# Patient Record
Sex: FEMALE | Race: WHITE | HISPANIC OR LATINO | Employment: UNEMPLOYED | URBAN - METROPOLITAN AREA
[De-identification: names, ages, dates, MRNs, and addresses within clinical notes are randomized per-mention and may not be internally consistent; named-entity substitution may affect disease eponyms.]

---

## 2017-03-21 ENCOUNTER — OFFICE VISIT (OUTPATIENT)
Dept: MEDICAL GROUP | Facility: PHYSICIAN GROUP | Age: 32
End: 2017-03-21
Payer: COMMERCIAL

## 2017-03-21 VITALS
OXYGEN SATURATION: 97 % | SYSTOLIC BLOOD PRESSURE: 118 MMHG | DIASTOLIC BLOOD PRESSURE: 70 MMHG | TEMPERATURE: 97.3 F | HEIGHT: 56 IN | WEIGHT: 140 LBS | HEART RATE: 84 BPM | BODY MASS INDEX: 31.49 KG/M2

## 2017-03-21 DIAGNOSIS — Z11.3 SCREENING FOR STD (SEXUALLY TRANSMITTED DISEASE): ICD-10-CM

## 2017-03-21 DIAGNOSIS — Z86.32 HISTORY OF GESTATIONAL DIABETES: ICD-10-CM

## 2017-03-21 DIAGNOSIS — R10.13 EPIGASTRIC PAIN: ICD-10-CM

## 2017-03-21 DIAGNOSIS — N94.10 DYSPAREUNIA IN FEMALE: ICD-10-CM

## 2017-03-21 DIAGNOSIS — E66.9 OBESITY, UNSPECIFIED OBESITY SEVERITY, UNSPECIFIED OBESITY TYPE: ICD-10-CM

## 2017-03-21 PROCEDURE — 99214 OFFICE O/P EST MOD 30 MIN: CPT | Performed by: FAMILY MEDICINE

## 2017-03-21 RX ORDER — OMEPRAZOLE 20 MG/1
20 CAPSULE, DELAYED RELEASE ORAL
Qty: 30 CAP | Refills: 5 | Status: SHIPPED | OUTPATIENT
Start: 2017-03-21 | End: 2022-03-09

## 2017-03-21 ASSESSMENT — PAIN SCALES - GENERAL: PAINLEVEL: 8=MODERATE-SEVERE PAIN

## 2017-03-21 ASSESSMENT — PATIENT HEALTH QUESTIONNAIRE - PHQ9: CLINICAL INTERPRETATION OF PHQ2 SCORE: 2

## 2017-03-22 NOTE — ASSESSMENT & PLAN NOTE
She has history of gestational diabetes with her previous 2 pregnancies. Her last pregnancy was 3 years back. Since she is at risk for developing diabetes she wants to be checked for that today.

## 2017-03-22 NOTE — PROGRESS NOTES
Subjective:   Jyotsna Franks is a 31 y.o. female here today for abdominal pain.    Epigastric pain  Patient complains of epigastric pain that has been going on for 4 weeks. 4 weeks prior, she had an episode of possibly food poisoning after eating at a restaurant. That however resolved but the following day she states that she had black soft stool which lasted only for one day. Since then she reports that she has intermittent epigastric pain, burning in character, worse with eating spicy food, more meat, caffeine, alcohol. Denies any nausea, vomiting.    History of gestational diabetes  She has history of gestational diabetes with her previous 2 pregnancies. Her last pregnancy was 3 years back. Since she is at risk for developing diabetes she wants to be checked for that today.    Dyspareunia in female  Reports pain during sexual activity, specially during the penetration phase.Has tried lubricants but that gives burning sensation.Has sexual activity about 1-2 times weekly.Denies any vaginal discharge or burning with urination.No history of STDs but would like to be check for STDs.           Current medicines (including changes today)  Current Outpatient Prescriptions   Medication Sig Dispense Refill   • omeprazole (PRILOSEC) 20 MG delayed-release capsule Take 1 Cap by mouth every morning before breakfast. 30 Cap 5   • albuterol (VENTOLIN OR PROVENTIL) 108 (90 BASE) MCG/ACT Aero Soln inhalation aerosol Inhale 2 Puffs by mouth every 6 hours as needed for Shortness of Breath. 8.5 g 0   • azithromycin (ZITHROMAX) 250 MG Tab Take two tabs on day one followed by one tab on days 2-5. 6 Tab 0   • benzonatate (TESSALON) 100 MG Cap Take 1 Cap by mouth 3 times a day as needed for Cough. 60 Cap 0   • guaifenesin-codeine (ROBITUSSIN AC) Solution oral solution Take 5 mL by mouth at bedtime as needed for Cough. 120 mL 0   • oxycodone-acetaminophen (PERCOCET) 5-325 MG TABS Take 1 Tab by mouth every four hours as needed  "for Moderate Pain ((Pain Scale 4-6); If acetaminophen ineffective). 15 Tab 0   • ibuprofen (MOTRIN) 800 MG TABS Take 1 Tab by mouth every 8 hours as needed (Cramping). 30 Tab 1     No current facility-administered medications for this visit.     She  has a past medical history of Gestational diabetes.    ROS   No chest pain, no shortness of breath, no abdominal pain  No cough, URI symptoms.  No heat or cold intolerance. No constipation, diarrhea.     Objective:     Blood pressure 118/70, pulse 84, temperature 36.3 °C (97.3 °F), height 1.41 m (4' 7.5\"), weight 63.504 kg (140 lb), last menstrual period 01/06/2014, SpO2 97 %. Body mass index is 31.94 kg/(m^2).     PHYSICAL EXAM     GEN: Alert and oriented,well appearing, no acute distress  SKIN: warm, dry to touch, no rashes or lesions in visible areas  PSYCH: mood and affect normal, judgement normal  EYES: Conjunctiva clear, lids normal,pupils equal round and reactive  ENMT: Normal external nose and ears,EACs normal appearing, TM pearly gray with normal light reflex bilaterally,nasal mucosa and turbinates normal appearing without erythema or edema, lips without lesions,good dentition,oropharynx clear  Neck : Trachea midline, no masses or swelling, no thyromegaly  LYMPHATIC : No cervical or supraclavicular lymphadenopathy  RESPIRATORY : Unlabored respiratory effort, no distress noted, clear to auscultation bilaterally, no wheeze, rhonchi, crackles  CARDIOVASCULAR: RRR, S1 S2 normal, no murmurs  GI: Soft,epigastric tenderness + , No rebound or guarding, no hepatosplenomegaly, no masses, Worley's sing negative  MUSCULOSKELETAL : Normal gait and stance, no obvious abnormalities   NEURO: No overt focal neurologic deficits,sensation intact        Assessment and Plan:   The following treatment plan was discussed    1. Epigastric pain  This is likely secondary to gastritis vs PUD. Advised patient to cut down on spicy, oily food and also caffeine intake.  - H.PYLORI STOOL " ANTIGEN; Future  - CBC WITH DIFFERENTIAL; Future  - COMP METABOLIC PANEL; Future  - omeprazole (PRILOSEC) 20 MG delayed-release capsule; Take 1 Cap by mouth every morning before breakfast.  Dispense: 30 Cap; Refill: 5  Will monitor symptoms.  2. History of gestational diabetes  - HEMOGLOBIN A1C; Future  - LIPID PROFILE; Future    3. Dyspareunia in female  We will have to rule out any urinary or pelvic infections.  - GC/Chlamydia PCR ordered  - URINALYSIS,CULTURE IF INDICATED; Future  - STD screening tests as below    4. Screening for STD (sexually transmitted disease)  - Consent for HIV Diagnostic  - HIV ANTIBODIES; Future  - RPR QUAL W/REFLEX; Future  - HEP C VIRUS ANTIBODY    5. Obesity, unspecified obesity severity, unspecified obesity type  Patient does not get time to exercise but she wants a referral to a dietitian to be able to work on her diet to lose weight.  We will discuss this more on next appointment  - LIPID PROFILE; Future  - REFERRAL TO NUTRITION SERVICES      Followup: Return in about 1 month (around 4/21/2017) for GYN Exam.         Please note that this dictation was created using voice recognition software. I have made every reasonable attempt to correct obvious errors, but I expect that there are errors of grammar and possibly content that I did not discover before finalizing the note.

## 2017-03-22 NOTE — ASSESSMENT & PLAN NOTE
Patient complains of epigastric pain that has been going on for 4 weeks. 4 weeks prior, she had an episode of possibly food poisoning after eating at a restaurant. That however resolved but the following day she states that she had black soft stool which lasted only for one day. Since then she reports that she has intermittent epigastric pain, burning in character, worse with eating spicy food, more meat, caffeine, alcohol. Denies any nausea, vomiting.

## 2017-03-22 NOTE — ASSESSMENT & PLAN NOTE
Reports pain during sexual activity, specially during the penetration phase.Has tried lubricants but that gives burning sensation.Has sexual activity about 1-2 times weekly.Denies any vaginal discharge or burning with urination.No history of STDs but would like to be check for STDs.

## 2017-03-24 ENCOUNTER — HOSPITAL ENCOUNTER (OUTPATIENT)
Facility: MEDICAL CENTER | Age: 32
End: 2017-03-24
Attending: FAMILY MEDICINE
Payer: COMMERCIAL

## 2017-03-24 PROCEDURE — 87338 HPYLORI STOOL AG IA: CPT

## 2017-03-25 ENCOUNTER — HOSPITAL ENCOUNTER (OUTPATIENT)
Dept: LAB | Facility: MEDICAL CENTER | Age: 32
End: 2017-03-25
Attending: FAMILY MEDICINE
Payer: COMMERCIAL

## 2017-03-25 DIAGNOSIS — Z86.32 HISTORY OF GESTATIONAL DIABETES: ICD-10-CM

## 2017-03-25 DIAGNOSIS — N94.10 DYSPAREUNIA IN FEMALE: ICD-10-CM

## 2017-03-25 DIAGNOSIS — R10.13 EPIGASTRIC PAIN: ICD-10-CM

## 2017-03-25 DIAGNOSIS — E66.9 OBESITY, UNSPECIFIED OBESITY SEVERITY, UNSPECIFIED OBESITY TYPE: ICD-10-CM

## 2017-03-25 DIAGNOSIS — Z11.3 SCREENING FOR STD (SEXUALLY TRANSMITTED DISEASE): ICD-10-CM

## 2017-03-25 LAB
ALBUMIN SERPL BCP-MCNC: 4.2 G/DL (ref 3.2–4.9)
ALBUMIN/GLOB SERPL: 1.4 G/DL
ALP SERPL-CCNC: 72 U/L (ref 30–99)
ALT SERPL-CCNC: 17 U/L (ref 2–50)
ANION GAP SERPL CALC-SCNC: 8 MMOL/L (ref 0–11.9)
APPEARANCE UR: ABNORMAL
AST SERPL-CCNC: 17 U/L (ref 12–45)
BACTERIA #/AREA URNS HPF: ABNORMAL /HPF
BASOPHILS # BLD AUTO: 0.04 K/UL (ref 0–0.12)
BASOPHILS NFR BLD AUTO: 0.6 % (ref 0–1.8)
BILIRUB SERPL-MCNC: 0.5 MG/DL (ref 0.1–1.5)
BILIRUB UR QL STRIP.AUTO: NEGATIVE
BUDDING YEAST  1720B: PRESENT /HPF
BUN SERPL-MCNC: 14 MG/DL (ref 8–22)
CALCIUM SERPL-MCNC: 9.3 MG/DL (ref 8.5–10.5)
CHLORIDE SERPL-SCNC: 106 MMOL/L (ref 96–112)
CHOLEST SERPL-MCNC: 214 MG/DL (ref 100–199)
CO2 SERPL-SCNC: 25 MMOL/L (ref 20–33)
COLOR UR AUTO: ABNORMAL
CREAT SERPL-MCNC: 0.48 MG/DL (ref 0.5–1.4)
CULTURE IF INDICATED INDCX: YES UA CULTURE
EOSINOPHIL # BLD: 0.19 K/UL (ref 0–0.51)
EOSINOPHIL NFR BLD AUTO: 2.9 % (ref 0–6.9)
EPITHELIAL CELLS 1715: ABNORMAL /HPF
ERYTHROCYTE [DISTWIDTH] IN BLOOD BY AUTOMATED COUNT: 40.5 FL (ref 35.9–50)
EST. AVERAGE GLUCOSE BLD GHB EST-MCNC: 123 MG/DL
GLOBULIN SER CALC-MCNC: 3.1 G/DL (ref 1.9–3.5)
GLUCOSE SERPL-MCNC: 132 MG/DL (ref 65–99)
GLUCOSE UR STRIP.AUTO-MCNC: NEGATIVE MG/DL
HBA1C MFR BLD: 5.9 % (ref 0–5.6)
HCT VFR BLD AUTO: 39.8 % (ref 37–47)
HCV AB S/CO SERPL IA: NEGATIVE
HDLC SERPL-MCNC: 48 MG/DL
HGB BLD-MCNC: 13.4 G/DL (ref 12–16)
HIV 1+2 AB+HIV1 P24 AG SERPL QL IA: NON REACTIVE
IMM GRANULOCYTES # BLD AUTO: 0.02 K/UL (ref 0–0.11)
IMM GRANULOCYTES NFR BLD AUTO: 0.3 % (ref 0–0.9)
KETONES UR STRIP.AUTO-MCNC: ABNORMAL MG/DL
LDLC SERPL CALC-MCNC: 97 MG/DL
LEUKOCYTE ESTERASE UR QL STRIP.AUTO: ABNORMAL
LYMPHOCYTES # BLD: 2.01 K/UL (ref 1–4.8)
LYMPHOCYTES NFR BLD AUTO: 30.4 % (ref 22–41)
MCH RBC QN AUTO: 29.8 PG (ref 27–33)
MCHC RBC AUTO-ENTMCNC: 33.7 G/DL (ref 33.6–35)
MCV RBC AUTO: 88.6 FL (ref 81.4–97.8)
MICRO URNS: ABNORMAL
MONOCYTES # BLD: 0.36 K/UL (ref 0–0.85)
MONOCYTES NFR BLD AUTO: 5.4 % (ref 0–13.4)
MUCOUS THREADS URNS QL MICRO: ABNORMAL /HPF
NEUTROPHILS # BLD: 3.99 K/UL (ref 2–7.15)
NEUTROPHILS NFR BLD AUTO: 60.4 % (ref 44–72)
NITRITE UR QL STRIP.AUTO: NEGATIVE
NRBC # BLD AUTO: 0 K/UL
NRBC BLD-RTO: 0 /100 WBC
PH UR: 6 [PH]
PLATELET # BLD AUTO: 316 K/UL (ref 164–446)
PMV BLD AUTO: 9.8 FL (ref 9–12.9)
POTASSIUM SERPL-SCNC: 3.9 MMOL/L (ref 3.6–5.5)
PROT SERPL-MCNC: 7.3 G/DL (ref 6–8.2)
PROT UR QL STRIP: 200 MG/DL
RBC # BLD AUTO: 4.49 M/UL (ref 4.2–5.4)
RBC #/AREA URNS HPF: >150 /HPF
RBC UR QL AUTO: ABNORMAL
SODIUM SERPL-SCNC: 139 MMOL/L (ref 135–145)
SP GR UR STRIP.AUTO: 1.02
TREPONEMA PALLIDUM IGG+IGM AB [PRESENCE] IN SERUM OR PLASMA BY IMMUNOASSAY: NON REACTIVE
TRIGL SERPL-MCNC: 347 MG/DL (ref 0–149)
WBC # BLD AUTO: 6.6 K/UL (ref 4.8–10.8)
WBC #/AREA URNS HPF: ABNORMAL /HPF

## 2017-03-25 PROCEDURE — 86803 HEPATITIS C AB TEST: CPT

## 2017-03-25 PROCEDURE — 81001 URINALYSIS AUTO W/SCOPE: CPT

## 2017-03-25 PROCEDURE — 86780 TREPONEMA PALLIDUM: CPT

## 2017-03-25 PROCEDURE — 87389 HIV-1 AG W/HIV-1&-2 AB AG IA: CPT

## 2017-03-25 PROCEDURE — 87086 URINE CULTURE/COLONY COUNT: CPT

## 2017-03-25 PROCEDURE — 80061 LIPID PANEL: CPT

## 2017-03-25 PROCEDURE — 80053 COMPREHEN METABOLIC PANEL: CPT

## 2017-03-25 PROCEDURE — 85025 COMPLETE CBC W/AUTO DIFF WBC: CPT

## 2017-03-25 PROCEDURE — 83036 HEMOGLOBIN GLYCOSYLATED A1C: CPT

## 2017-03-25 PROCEDURE — 87077 CULTURE AEROBIC IDENTIFY: CPT

## 2017-03-25 PROCEDURE — 36415 COLL VENOUS BLD VENIPUNCTURE: CPT

## 2017-03-27 ENCOUNTER — TELEPHONE (OUTPATIENT)
Dept: MEDICAL GROUP | Facility: PHYSICIAN GROUP | Age: 32
End: 2017-03-27

## 2017-03-27 LAB
BACTERIA UR CULT: ABNORMAL
BACTERIA UR CULT: ABNORMAL
SIGNIFICANT IND 70042: ABNORMAL
SOURCE SOURCE: ABNORMAL

## 2017-03-27 RX ORDER — FLUCONAZOLE 150 MG/1
150 TABLET ORAL DAILY
Qty: 1 TAB | Refills: 0 | Status: SHIPPED | OUTPATIENT
Start: 2017-03-27 | End: 2017-11-02

## 2017-03-27 NOTE — TELEPHONE ENCOUNTER
Talked to Pt to let her know that her prescription is at her preferred pharmacy, and that Dr. Shrestha would like her to make an appointment to go over her labs.

## 2017-03-27 NOTE — TELEPHONE ENCOUNTER
Please call patient and informed that I have received her blood and urine test results. Her urine shows that there is some yeast (fungal elements). This could probably be coming from her vagina. I have sent a prescription for an antifungal Diflucan to her pharmacy. She only has to take one pill. Regarding her blood work, there are some abnormalities including increased blood sugar, cholesterol. Therefore we need to discuss it in an office visit. Please advise her to make an appointment with me for discussing labs.      Crystal Dailey M.D.

## 2017-03-28 LAB — H PYLORI AG STL QL IA: NEGATIVE

## 2017-03-29 ENCOUNTER — TELEPHONE (OUTPATIENT)
Dept: MEDICAL GROUP | Facility: PHYSICIAN GROUP | Age: 32
End: 2017-03-29

## 2017-03-29 NOTE — TELEPHONE ENCOUNTER
----- Message from Crystal Dailey M.D. sent at 3/28/2017  4:45 PM PDT -----  Please call patient to better now that her stool test was normal.

## 2017-03-29 NOTE — Clinical Note
"March 29, 2017         Jyotsna Franks  [address]   [place] [address]        Dear Jyotsna:    Lab reviewed  And your stool test was normal.       Resulted Orders   H.PYLORI STOOL ANTIGEN   Result Value Ref Range    H Pylori Ag Stool E Negative Negative      Comment:      Performed by [facility],  [facility] [place] [address] [phone]  [email or web address], Israel Rucker MD - Lab. Director      Narrative    Patient height (in inches)->1.41 m (4' 7.5\")  Patient weight (in lbs)->140.126327         If you have any questions or concerns, please don't hesitate to call.        Sincerely,      Crystal Dailey M.D.    Electronically Signed  "

## 2017-05-02 ENCOUNTER — OFFICE VISIT (OUTPATIENT)
Dept: MEDICAL GROUP | Facility: PHYSICIAN GROUP | Age: 32
End: 2017-05-02
Payer: COMMERCIAL

## 2017-05-02 VITALS
TEMPERATURE: 98.4 F | WEIGHT: 142 LBS | DIASTOLIC BLOOD PRESSURE: 72 MMHG | RESPIRATION RATE: 14 BRPM | OXYGEN SATURATION: 97 % | SYSTOLIC BLOOD PRESSURE: 116 MMHG | HEIGHT: 56 IN | BODY MASS INDEX: 31.94 KG/M2 | HEART RATE: 78 BPM

## 2017-05-02 DIAGNOSIS — R73.03 PREDIABETES: ICD-10-CM

## 2017-05-02 DIAGNOSIS — H54.7 DECREASED VISUAL ACUITY: ICD-10-CM

## 2017-05-02 DIAGNOSIS — E78.5 DYSLIPIDEMIA: ICD-10-CM

## 2017-05-02 PROCEDURE — 99214 OFFICE O/P EST MOD 30 MIN: CPT | Performed by: FAMILY MEDICINE

## 2017-05-02 RX ORDER — OMEGA-3-ACID ETHYL ESTERS 1 G/1
1 CAPSULE, LIQUID FILLED ORAL 4 TIMES DAILY
Qty: 120 CAP | Refills: 1 | Status: SHIPPED | OUTPATIENT
Start: 2017-05-02 | End: 2022-03-09

## 2017-05-02 NOTE — PATIENT INSTRUCTIONS
Colesterol elevado  (High Cholesterol)  El término colesterol elevado hace referencia a jacinta concentración mingo de colesterol en la cristian. El colesterol es jacinta proteína chago y cerosa parecida a la grasa que el organismo necesita en pequeñas cantidades. El hígado fabrica todo el colesterol que necesita. El exceso de colesterol proviene de los alimentos que come.  El colesterol viaja por el torrente sanguíneo hasta los vasos sanguíneos. Si tiene el colesterol elevado, beka puede depositarse (placa) en las linares de los vasos sanguíneos, lo que ocasiona el estrechamiento y la rigidez de las arterias. La placa aumenta el riesgo de ataque cardíaco y de ictus. Trabaje con el médico para mantener las concentraciones de colesterol en un rango saludable.  FACTORES DE RIESGO  Existen varios factores que pueden aumentar la probabilidad de que tenga colesterol elevado. Estos incluyen:   · Consumir alimentos con alto contenido de grasa animal (grasa saturada) o colesterol.  · Tener sobrepeso.  · No hacer suficiente ejercicio físico.  · Tener antecedentes familiares de colesterol elevado.  SIGNOS Y SÍNTOMAS  El colesterol elevado no causa síntomas.  DIAGNÓSTICO   El médico puede realizar un análisis de cristian para controlar si tiene el colesterol elevado. Si es mayor de 20 años, el médico puede controlarle el colesterol cada 4 a 6 años. Los controles pueden ser más frecuentes si ya tuvo el colesterol elevado u otros factores de riesgo de enfermedades cardíacas. El análisis de cristian de colesterol mide lo siguiente:  · El colesterol fracisco (colesterol LDL), el tipo que causa enfermedades cardíacas. Beka valor debe estar por debajo de 100.  · El colesterol weber (colesterol HDL), el tipo que ayuda a brindar protección contra las enfermedades cardíacas. El nivel saludable de colesterol HDL es 60 o más.  · Colesterol total Es el valor combinado del colesterol LDL y el HDL. El valor saludable es de menos de 200.  TRATAMIENTO   El  colesterol elevado puede tratarse con cambios en la dieta y el estilo de prachi, y con medicamentos.   · Los cambios en la dieta pueden incluir la ingesta de jacinta mayor cantidad de cereales integrales, frutas, verduras, alfredo secos y pescado. Además, chase vez deba dejar de comer carne keyanna y alimentos con gran cantidad de azúcares agregados.  · Los cambios en el estilo de prachi pueden incluir sesiones de ejercicios aeróbicos madie por lo menos 40 minutos, bill veces por semana, entre ellos, caminar, andar en bicicleta y nadar. Los ejercicios aeróbicos junto con jacinta dieta wanda pueden ayudar a que se mantenga en un peso saludable. Los cambios en el estilo de prachi también pueden incluir dejar de fumar.  · Si los cambios en la dieta y el estilo de prachi no son suficientes para bajar el colesterol, el médico puede recetarle jacinta estatina. Se ha demostrado que beka medicamento reduce el colesterol y, además, el riesgo de enfermedades cardíacas.  INSTRUCCIONES PARA EL CUIDADO EN EL HOGAR  · [place] los medicamentos de venta sharon o recetados solamente según las indicaciones del médico.  · Lleve jacinta dieta wanda chase eddie el médico le indicó. Por ejemplo:  ¨ Coma latanya (sin piel), pescado, ternera, mariscos, pechuga de pavo molida y muñoz de carne keyanna de pulpa o de lomo.  ¨ No coma comidas fritas y karen grasas, eddie salchichas y maura.  ¨ Coma muchas frutas, eddie manzanas.  ¨ Coma gran cantidad de verduras, eddie brócoli, papo y zanahorias.  ¨ Coma porotos, guisantes secos y lentejas.  ¨ Coma cereales, eddie cebada, arroz, cuscus y sanjana burgol.  ¨ Coma pastas sin salsas con crema.  ¨ [place] leche semidescremada y sin grasa, y coma yogur y quesos bajos en grasas o sin grasa. No coma ni clem leche entera, crema, helado, yemas de huevo y quesos duros.  ¨ No coma margarinas en milana ni untables que contengan grasas trans (que también se conocen eddie aceites parcialmente hidrogenados).  ¨ No coma tortas, galletas, galletitas ni otros  productos horneados que contengan grasas trans.  ¨ No coma aceites tropicales saturados, eddie el de pavel y el de webster.  · Rajiv ejercicio según las indicaciones del médico. Aumente el nivel de actividad, por ejemplo, rajiv jardinería o salga a caminar.  · Cumpla con todas las visitas de control.  SOLICITE ATENCIÓN MÉDICA SI:  · Tiene dificultad para seguir jacinta dieta wanda o mantener un peso saludable.  · Necesita ayuda para comenzar un programa de ejercicios.  · Necesita ayuda para dejar de fumar.  SOLICITE ATENCIÓN MÉDICA DE INMEDIATO SI:  · Siente dolor en el pecho.  · Tiene dificultad para respirar.     Esta información no tiene eddie fin reemplazar el consejo del médico. Asegúrese de hacerle al médico cualquier pregunta que tenga.     Document Released: 12/18/2006 Document Revised: 01/08/2016  [employer] Patient Education ©2016 [employer]  Dieta restringida en grasas y colesterol  (Fat and Cholesterol Restricted Diet)  El exceso de grasas y colesterol en la dieta puede causar problemas de frank. Esta dieta lo ayudará a mantener las grasas y el colesterol en los niveles normales para evitar enfermarse.  ¿QUÉ TIPOS DE GRASAS KALLIE ELEGIR?  · Elija grasas monosaturadas y polinsaturadas. Estas se encuentran en alimentos eddie el aceite de menjivar, aceite de canola, semillas de jon, nueces, almendras y semillas.  · Consuma más grasas omega-3. Las mejores opciones incluyen salmón, caballa, agapito, atún, aceite de jon y semillas de jon molidas.  · Limite el consumo de grasas saturadas, que se encuentran en productos de origen animal, eddie karen, mantequilla y crema. También pueden estar en productos vegetales, eddie aceite de webster, de palmiste y de pavel.  ·  Evite los alimentos con aceites parcialmente hidrogenados. Estos contienen grasas trans. Entre los ejemplos de alimentos con grasas trans se incluyen margarinas en milana, algunas margarinas untables, galletas dulces o saladas y otros productos  "horneados.  ¿QUÉ PAUTAS GENERALES KALLIE SEGUIR?   · Sydnee las etiquetas de los alimentos. Busque las palabras \"grasas trans\" y \"grasas saturadas\".  · Al preparar jacinta comida:  · Llene la mitad del plato con verduras y ensaladas de hojas verdes.  · Llene un cuarto del plato con cereales integrales. Busque la palabra \"integral\" en el primer lugar de la lista de ingredientes.  · Llene un cuarto del plato con alimentos con proteínas magras.  · Limite las frutas a dos porciones por día. Elija frutas en lugar de jugos.  · Coma más alimentos con fibra soluble, por ejemplo, manzanas, brócoli, zanahorias, frijoles, guisantes y cebada. Trate de consumir de 20 a 30 g (gramos) de fibra por día.  · Coma más comidas caseras. Coma menos en los restaurantes y los bares.  · Limite o evite el alcohol.  · Limite los alimentos con alto contenido de almidón y azúcar.  · Limite el consumo de alimentos fritos.  · Cocine los alimentos sin freírlos. Las opciones de cocción más adecuadas son hornear, hervir, grillar y asar a la sai.  · Baje de peso si es necesario. Aunque pierda poco peso, esto puede ser importante para la frank general. También puede ayudar a prevenir enfermedades eddie diabetes y enfermedad cardíaca.  ¿QUÉ ALIMENTOS PUEDO COMER?  Cereales  Cereales integrales, eddie los panes de salvado o integrales, las galletas, los cereales y las pastas. Marciano sin endulzar, sanjana, cebada, quinua o arroz integral. Tortillas de harina de maíz o de salvado.  Verduras  Verduras frescas o congeladas (crudas, al vapor, asadas o grilladas). Ensaladas de hojas verdes.  Frutas  Frutas frescas, en conserva (en cummings jugo natural) o frutas congeladas.  Balwinder y otros productos con proteínas  Carne de res molida (al 85 % o más magra), carne de res de animales alimentados con pastos o carne de res sin la grasa. Neymar o pavo sin piel. Carne de neymar o de pavo molida. Cerdo sin la grasa. Todos los pescados y alfredo de mar. Huevos. Porotos, guisantes o " lentejas secos. Clarissa secos o semillas sin sal. Frijoles secos o en cirilo sin sal.  Lácteos  Productos lácteos con bajo contenido de grasas, eddie leche descremada o al 1 %, quesos reducidos en grasas o al 2 %, ricota con bajo contenido de grasas o queso cottage, o yogur natural con bajo contenido de grasas.  Grasas y aceites  Margarinas untables que no contengan grasas trans. [place]onesa y condimentos para ensaladas livianos o reducidos en grasas. [place]. Aceites de menjivar, canola, sésamo o cártamo. Mantequilla natural de cacahuate o moriah (elija la que no tenga agregado de aceite o azúcar).  Los artículos mencionados arriba pueden no ser jacinta lista completa de las bebidas o los alimentos recomendados. Comuníquese con el nutricionista para conocer más opciones.  ¿QUÉ ALIMENTOS NO SE RECOMIENDAN?  Cereales  Pan little. Pastas moose. Arroz little. Pan de maíz. Bagels, pasteles y croissants. Galletas saladas que contengan grasas trans.  Verduras  Enid moose. Maíz. Verduras con crema o fritas. Verduras en salsa de queso.  Frutas  Frutas secas. Fruta enlatada en almíbar liviano o espeso. Jugo de frutas.  Balwinder y otros productos con proteínas  Bryson de carne con grasa. Costillas, tran de latanya, tocineta, salchicha, mortadela, maura, chinchulines, tocino, perros calientes, salchichas alemanas y embutidos envasados. Hígado y otros órganos.  Lácteos  Leche entera o al 2 %, crema, mezcla de leche y crema, y queso crema. Quesos enteros. Yogur entero o endulzado. Quesos con toda cummings grasa. Cremas no lácteas y coberturas batidas. Quesos procesados, quesos para untar o cuajadas.  Dulces y postres  Jarabe de maíz, azúcares, miel y melazas. Caramelos. Mermelada y jalea. Jarabe. Cereales endulzados. Galletas, pasteles, bizcochuelos, donas, muffins y helado.  Grasas y aceites  Mantequilla, margarina en milana, manteca de cerdo, grasa, mantequilla clarificada o grasa de tocino. Aceites de pavel, de palmiste o de  webster.  Bebidas  Alcohol. Bebidas endulzadas (eddie refrescos, limonadas y bebidas frutales o ponches).  Los artículos mencionados arriba pueden no ser jacinta lista completa de las bebidas y los alimentos que se deben evitar. Comuníquese con el nutricionista para obtener más información.     Esta información no tiene eddie fin reemplazar el consejo del médico. Asegúrese de hacerle al médico cualquier pregunta que tenga.     Document Released: 12/18/2006 Document Revised: 01/08/2016  [employer] Interactive Patient Education ©2016 [employer] Inc.

## 2017-05-02 NOTE — MR AVS SNAPSHOT
"        Jyotsna Franks   2017 4:40 PM   Office Visit   MRN: [id number]    Department:  [facility]   Dept Phone:  [phone]    Description:  Female : 1985   Provider:  Crystal Dailey M.D.           Reason for Visit     Results labs      Allergies as of 2017     Allergen Noted Reactions    Pcn [Penicillins] 2009   Anaphylaxis      You were diagnosed with     Dyslipidemia   [559261]         Vital Signs     Blood Pressure Pulse Temperature Respirations Height Weight    116/72 mmHg 78 36.9 °C (98.4 °F) 14 1.41 m (4' 7.5\") 64.411 kg (142 lb)    Body Mass Index Oxygen Saturation Last Menstrual Period Smoking Status          32.40 kg/m2 97% 2014 Never Smoker         Basic Information     Date Of Birth Sex Race Ethnicity Preferred Language    1985 Female  or   Origin (Wolof,Pakistani,Ugandan,Lawrence, etc) English      Your appointments     2017 10:00 AM   Established Patient with Crystal Dailey M.D.   [facility] ([address] [address]   [phone]           You will be receiving a confirmation call a few days before your appointment from our automated call confirmation system.              Problem List              ICD-10-CM Priority Class Noted - Resolved    History of gestational diabetes Z86.32   2014 - Present    Epigastric pain R10.13   3/21/2017 - Present    Dyspareunia in female N94.10   3/21/2017 - Present      Health Maintenance        Date Due Completion Dates    IMM DTaP/Tdap/Td Vaccine (1 - Tdap) 2003 ---    PAP SMEAR 2005 ---            Current Immunizations     INFLUENZA VACCINE H1N1 2009      Below and/or attached are the medications your provider expects you to take. Review all of your home medications and newly ordered medications with your provider and/or pharmacist. Follow medication instructions as directed by your provider and/or pharmacist. Please keep " your medication list with you and share with your provider. Update the information when medications are discontinued, doses are changed, or new medications (including over-the-counter products) are added; and carry medication information at all times in the event of emergency situations     Allergies:  PCN - Anaphylaxis               Medications  Valid as of: May 02, 2017 -  5:05 PM    Generic Name Brand Name Tablet Size Instructions for use    Albuterol Sulfate (Aero Soln) albuterol 108 (90 BASE) MCG/ACT Inhale 2 Puffs by mouth every 6 hours as needed for Shortness of Breath.        Azithromycin (Tab) ZITHROMAX 250 MG Take two tabs on day one followed by one tab on days 2-5.        Benzonatate (Cap) TESSALON 100 MG Take 1 Cap by mouth 3 times a day as needed for Cough.        Fluconazole (Tab) DIFLUCAN 150 MG Take 1 Tab by mouth every day.        Guaifenesin-Codeine (Solution) ROBITUSSIN -10 mg/5mL Take 5 mL by mouth at bedtime as needed for Cough.        Ibuprofen (Tab) MOTRIN 800 MG Take 1 Tab by mouth every 8 hours as needed (Cramping).        Omega-3-acid Ethyl Esters (Cap) LOVAZA 1 GM Take 1 Cap by mouth 4 times a day.        Omeprazole (CAPSULE DELAYED RELEASE) PRILOSEC 20 MG Take 1 Cap by mouth every morning before breakfast.        Oxycodone-Acetaminophen (Tab) PERCOCET 5-325 MG Take 1 Tab by mouth every four hours as needed for Moderate Pain ((Pain Scale 4-6); If acetaminophen ineffective).        .                 Medicines prescribed today were sent to:     [facility]/PHARMACY #[id number] - [place], [place] - [address] [place] [id number]    Phone: [phone] Fax: [phone]    Open 24 Hours?: No      Medication refill instructions:       If your prescription bottle indicates you have medication refills left, it is not necessary to call your provider’s office. Please contact your pharmacy and they will refill your medication.    If your prescription bottle  indicates you do not have any refills left, you may request refills at any time through one of the following ways: The online GeoTrac system (except Urgent Care), by calling your provider’s office, or by asking your pharmacy to contact your provider’s office with a refill request. Medication refills are processed only during regular business hours and may not be available until the next business day. Your provider may request additional information or to have a follow-up visit with you prior to refilling your medication.   *Please Note: Medication refills are assigned a new Rx number when refilled electronically. Your pharmacy may indicate that no refills were authorized even though a new prescription for the same medication is available at the pharmacy. Please request the medicine by name with the pharmacy before contacting your provider for a refill.        Instructions    Colesterol elevado  (High Cholesterol)  El término colesterol elevado hace referencia a jacinta concentración mingo de colesterol en la cristian. El colesterol es jacinta proteína chago y cerosa parecida a la grasa que el organismo necesita en pequeñas cantidades. El hígado fabrica todo el colesterol que necesita. El exceso de colesterol proviene de los alimentos que come.  El colesterol viaja por el torrente sanguíneo hasta los vasos sanguíneos. Si tiene el colesterol elevado, beka puede depositarse (placa) en las linares de los vasos sanguíneos, lo que ocasiona el estrechamiento y la rigidez de las arterias. La placa aumenta el riesgo de ataque cardíaco y de ictus. Trabaje con el médico para mantener las concentraciones de colesterol en un rango saludable.  FACTORES DE RIESGO  Existen varios factores que pueden aumentar la probabilidad de que tenga colesterol elevado. Estos incluyen:   · Consumir alimentos con alto contenido de grasa animal (grasa saturada) o colesterol.  · Tener sobrepeso.  · No hacer suficiente ejercicio físico.  · Tener antecedentes  familiares de colesterol elevado.  SIGNOS Y SÍNTOMAS  El colesterol elevado no causa síntomas.  DIAGNÓSTICO   El médico puede realizar un análisis de cristian para controlar si tiene el colesterol elevado. Si es mayor de 20 años, el médico puede controlarle el colesterol cada 4 a 6 años. Los controles pueden ser más frecuentes si ya tuvo el colesterol elevado u otros factores de riesgo de enfermedades cardíacas. El análisis de cristian de colesterol mide lo siguiente:  · El colesterol fracisco (colesterol LDL), el tipo que causa enfermedades cardíacas. Beka valor debe estar por debajo de 100.  · El colesterol weber (colesterol HDL), el tipo que ayuda a brindar protección contra las enfermedades cardíacas. El nivel saludable de colesterol HDL es 60 o más.  · Colesterol total Es el valor combinado del colesterol LDL y el HDL. El valor saludable es de menos de 200.  TRATAMIENTO   El colesterol elevado puede tratarse con cambios en la dieta y el estilo de prachi, y con medicamentos.   · Los cambios en la dieta pueden incluir la ingesta de jacinta mayor cantidad de cereales integrales, frutas, verduras, alfredo secos y pescado. Además, chase vez deba dejar de comer carne keyanna y alimentos con gran cantidad de azúcares agregados.  · Los cambios en el estilo de prachi pueden incluir sesiones de ejercicios aeróbicos madie por lo menos 40 minutos, bill veces por semana, entre ellos, caminar, andar en bicicleta y nadar. Los ejercicios aeróbicos junto con jacinta dieta awnda pueden ayudar a que se mantenga en un peso saludable. Los cambios en el estilo de prachi también pueden incluir dejar de fumar.  · Si los cambios en la dieta y el estilo de prachi no son suficientes para bajar el colesterol, el médico puede recetarle jacinta estatina. Se ha demostrado que beka medicamento reduce el colesterol y, además, el riesgo de enfermedades cardíacas.  INSTRUCCIONES PARA EL CUIDADO EN EL HOGAR  · [place] los medicamentos de venta sharon o recetados solamente según las  indicaciones del médico.  · Lleve jacinta dieta wanda chase eddie el médico le indicó. Por ejemplo:  ¨ Coma latanya (sin piel), pescado, ternera, mariscos, pechuga de pavo molida y muñoz de carne keyanna de pulpa o de lomo.  ¨ No coma comidas fritas y karen grasas, eddie salchichas y maura.  ¨ Coma muchas frutas, eddie manzanas.  ¨ Coma gran cantidad de verduras, eddie brócoli, papo y zanahorias.  ¨ Coma porotos, guisantes secos y lentejas.  ¨ Coma cereales, eddie cebada, arroz, cuscus y sanjana burgol.  ¨ Coma pastas sin salsas con crema.  ¨ [place] leche semidescremada y sin grasa, y coma yogur y quesos bajos en grasas o sin grasa. No coma ni clem leche entera, crema, helado, yemas de huevo y quesos duros.  ¨ No coma margarinas en milana ni untables que contengan grasas trans (que también se conocen eddie aceites parcialmente hidrogenados).  ¨ No coma tortas, galletas, galletitas ni otros productos horneados que contengan grasas trans.  ¨ No coma aceites tropicales saturados, eddie el de pavel y el de webster.  · Annamarie ejercicio según las indicaciones del médico. Aumente el nivel de actividad, por ejemplo, annamarie jardinería o salga a caminar.  · Cumpla con todas las visitas de control.  SOLICITE ATENCIÓN MÉDICA SI:  · Tiene dificultad para seguir jacinta dieta wanda o mantener un peso saludable.  · Necesita ayuda para comenzar un programa de ejercicios.  · Necesita ayuda para dejar de fumar.  SOLICITE ATENCIÓN MÉDICA DE INMEDIATO SI:  · Siente dolor en el pecho.  · Tiene dificultad para respirar.     Esta información no tiene eddie fin reemplazar el consejo del médico. Asegúrese de hacerle al médico cualquier pregunta que tenga.     Document Released: 12/18/2006 Document Revised: 01/08/2016  [employer] Interactive Patient Education ©2016 [employer]  Dieta restringida en grasas y colesterol  (Fat and Cholesterol Restricted Diet)  El exceso de grasas y colesterol en la dieta puede causar problemas de frank. Esta dieta lo ayudará a mantener las  "grasas y el colesterol en los niveles normales para evitar enfermarse.  ¿QUÉ TIPOS DE GRASAS KALLIE ELEGIR?  · Elija grasas monosaturadas y polinsaturadas. Estas se encuentran en alimentos eddie el aceite de menjivar, aceite de canola, semillas de jon, nueces, almendras y semillas.  · Consuma más grasas omega-3. Las mejores opciones incluyen salmón, caballa, agapito, atún, aceite de jon y semillas de jon molidas.  · Limite el consumo de grasas saturadas, que se encuentran en productos de origen animal, eddie karen, mantequilla y crema. También pueden estar en productos vegetales, eddie aceite de webster, de palmiste y de pavel.  ·  Evite los alimentos con aceites parcialmente hidrogenados. Estos contienen grasas trans. Entre los ejemplos de alimentos con grasas trans se incluyen margarinas en milana, algunas margarinas untables, galletas dulces o saladas y otros productos horneados.  ¿QUÉ PAUTAS GENERALES KALLIE SEGUIR?   · Sydnee las etiquetas de los alimentos. Busque las palabras \"grasas trans\" y \"grasas saturadas\".  · Al preparar jacinta comida:  · Llene la mitad del plato con verduras y ensaladas de hojas verdes.  · Llene un cuarto del plato con cereales integrales. Busque la palabra \"integral\" en el primer lugar de la lista de ingredientes.  · Llene un cuarto del plato con alimentos con proteínas magras.  · Limite las frutas a dos porciones por día. Elija frutas en lugar de jugos.  · Coma más alimentos con fibra soluble, por ejemplo, manzanas, brócoli, zanahorias, frijoles, guisantes y cebada. Trate de consumir de 20 a 30 g (gramos) de fibra por día.  · Coma más comidas caseras. Coma menos en los restaurantes y los bares.  · Limite o evite el alcohol.  · Limite los alimentos con alto contenido de almidón y azúcar.  · Limite el consumo de alimentos fritos.  · Cocine los alimentos sin freírlos. Las opciones de cocción más adecuadas son hornear, hervir, grillar y asar a la sai.  · Baje de peso si es necesario. Aunque pierda " poco peso, esto puede ser importante para la frank general. También puede ayudar a prevenir enfermedades eddie diabetes y enfermedad cardíaca.  ¿QUÉ ALIMENTOS PUEDO COMER?  Cereales  Cereales integrales, eddie los panes de salvado o integrales, las galletas, los cereales y las pastas. Marciano sin endulzar, sanjana, cebada, quinua o arroz integral. Tortillas de harina de maíz o de salvado.  Verduras  Verduras frescas o congeladas (crudas, al vapor, asadas o grilladas). Ensaladas de hojas verdes.  Frutas  Frutas frescas, en conserva (en cummings jugo natural) o frutas congeladas.  Balwinder y otros productos con proteínas  Carne de res molida (al 85 % o más magra), carne de res de animales alimentados con pastos o carne de res sin la grasa. Neymar o pavo sin piel. Carne de neymar o de pavo molida. Cerdo sin la grasa. Todos los pescados y alfredo de mar. Huevos. Porotos, guisantes o lentejas secos. Aflredo secos o semillas sin sal. Frijoles secos o en cirilo sin sal.  Lácteos  Productos lácteos con bajo contenido de grasas, eddie leche descremada o al 1 %, quesos reducidos en grasas o al 2 %, ricota con bajo contenido de grasas o queso cottage, o yogur natural con bajo contenido de grasas.  Grasas y aceites  Margarinas untables que no contengan grasas trans. Mayonesa y condimentos para ensaladas livianos o reducidos en grasas. [place]. Aceites de menjivar, canola, sésamo o cártamo. Mantequilla natural de cacahuate o moriah (elija la que no tenga agregado de aceite o azúcar).  Los artículos mencionados arriba pueden no ser jacinta lista completa de las bebidas o los alimentos recomendados. Comuníquese con el nutricionista para conocer más opciones.  ¿QUÉ ALIMENTOS NO SE RECOMIENDAN?  Cereales  Pan little. Pastas moose. Arroz little. Pan de maíz. Bagels, pasteles y croissants. Galletas saladas que contengan grasas trans.  Verduras  Enid moose. Maíz. Verduras con crema o fritas. Verduras en salsa de queso.  Frutas  Frutas secas. Fruta  enlatada en almíbar liviano o espeso. Jugo de frutas.  Balwinder y otros productos con proteínas  Bryson de carne con grasa. Costillas, tran de latanya, tocineta, salchicha, mortadela, maura, chinchulines, tocino, perros calientes, salchichas alemanas y embutidos envasados. Hígado y otros órganos.  Lácteos  Leche entera o al 2 %, crema, mezcla de leche y crema, y queso crema. Quesos enteros. Yogur entero o endulzado. Quesos con toda cummings grasa. Cremas no lácteas y coberturas batidas. Quesos procesados, quesos para untar o cuajadas.  Dulces y postres  Jarabe de maíz, azúcares, miel y melazas. Caramelos. Mermelada y jalea. Jarabe. Cereales endulzados. Galletas, pasteles, bizcochuelos, donas, muffins y helado.  Grasas y aceites  Mantequilla, margarina en milana, manteca de cerdo, grasa, mantequilla clarificada o grasa de tocino. Aceites de pavel, de palmiste o de webster.  Bebidas  Alcohol. Bebidas endulzadas (eddie refrescos, limonadas y bebidas frutales o ponches).  Los artículos mencionados arriba pueden no ser jacinta lista completa de las bebidas y los alimentos que se deben evitar. Comuníquese con el nutricionista para obtener más información.     Esta información no tiene eddie fin reemplazar el consejo del médico. Asegúrese de hacerle al médico cualquier pregunta que tenga.     Document Released: 12/18/2006 Document Revised: 01/08/2016  [employer] Interactive Patient Education ©2016 [employer]            [facility] Access Code: [email or web address]  Expires: 6/1/2017  5:03 PM    Your email address is not on file at [facility].  Email Addresses are required for you to sign up for [facility], please contact [phone] to verify your personal information and to provide your email address prior to attempting to register for [facility].    Jyotsna Franks  [address] [place] [address]    [facility]  A secure, online tool to manage your health information     [facility]’s [facility]® is a secure, online tool that connects  you to your personalized health information from the privacy of your home -- day or night - making it very easy for you to manage your healthcare. Once the activation process is completed, you can even access your medical information using the PeerIndex mayda, which is available for free in the Apple Mayda store or Google Play store.     To learn more about PeerIndex, visit [email or web address]    There are two levels of access available (as shown below):   My Chart Features  [facility]  Urgent  Care Non-[facility]   Email your healthcare team securely and privately 24/7 X X X    Manage appointments: schedule your next appointment; view details of past/upcoming appointments X      Request prescription refills. X      View recent personal medical records, including lab and immunizations X X X X   View health record, including health history, allergies, medications X X X X   Read reports about your outpatient visits, procedures, consult and ER notes X X X X   See your discharge summary, which is a recap of your hospital and/or ER visit that includes your diagnosis, lab results, and care plan X X  X     How to register for PeerIndex:  Once your e-mail address has been verified, follow the following steps to sign up for PeerIndex.     1. Go to  [email or web address]  2. Click on the Sign Up Now box, which takes you to the New Member Sign Up page. You will need to provide the following information:  a. Enter your PeerIndex Access Code exactly as it appears at the top of this page. (You will not need to use this code after you’ve completed the sign-up process. If you do not sign up before the expiration date, you must request a new code.)   b. Enter your date of birth.   c. Enter your home email address.   d. Click Submit, and follow the next screen’s instructions.  3. Create a PeerIndex ID. This will be your PeerIndex login ID and cannot be changed, so think of one that is  secure and easy to remember.  4. Create a [facility] password. You can change your password at any time.  5. Enter your Password Reset Question and Answer. This can be used at a later time if you forget your password.   6. Enter your e-mail address. This allows you to receive e-mail notifications when new information is available in Citrus.  7. Click Sign Up. You can now view your health information.    For assistance activating your [facility] account, call [phone]

## 2017-05-03 NOTE — PROGRESS NOTES
Subjective:   Jyotsna Franks is a 31 y.o. female here today for follow up on recent lab results and complaining of blurry vision     Patient is here to discuss recent lab results.She is doing well.She reports that both she and her  are trying to obtain a gym membership so that they can start working out.She also trying to change her diet and change things in her kitchen so that she can eat more healthy.  She complains today that for the past few weeks she has been having trouble with vision.Sometimes it is blurry, she is not able to read small print.Denies any headaches or eye pain, eye discharge.    Current medicines (including changes today)  Current Outpatient Prescriptions   Medication Sig Dispense Refill   • omega-3 acid ethyl esters (LOVAZA) 1 GM capsule Take 1 Cap by mouth 4 times a day. 120 Cap 1   • omeprazole (PRILOSEC) 20 MG delayed-release capsule Take 1 Cap by mouth every morning before breakfast. 30 Cap 5   • fluconazole (DIFLUCAN) 150 MG tablet Take 1 Tab by mouth every day. 1 Tab 0   • albuterol (VENTOLIN OR PROVENTIL) 108 (90 BASE) MCG/ACT Aero Soln inhalation aerosol Inhale 2 Puffs by mouth every 6 hours as needed for Shortness of Breath. 8.5 g 0   • azithromycin (ZITHROMAX) 250 MG Tab Take two tabs on day one followed by one tab on days 2-5. 6 Tab 0   • benzonatate (TESSALON) 100 MG Cap Take 1 Cap by mouth 3 times a day as needed for Cough. 60 Cap 0   • guaifenesin-codeine (ROBITUSSIN AC) Solution oral solution Take 5 mL by mouth at bedtime as needed for Cough. 120 mL 0   • oxycodone-acetaminophen (PERCOCET) 5-325 MG TABS Take 1 Tab by mouth every four hours as needed for Moderate Pain ((Pain Scale 4-6); If acetaminophen ineffective). 15 Tab 0   • ibuprofen (MOTRIN) 800 MG TABS Take 1 Tab by mouth every 8 hours as needed (Cramping). 30 Tab 1     No current facility-administered medications for this visit.     She  has a past medical history of Gestational diabetes.    ROS   No  "chest pain, no shortness of breath, no abdominal pain       Objective:     Blood pressure 116/72, pulse 78, temperature 36.9 °C (98.4 °F), resp. rate 14, height 1.41 m (4' 7.5\"), weight 64.411 kg (142 lb), last menstrual period 01/06/2014, SpO2 97 %. Body mass index is 32.4 kg/(m^2).     PHYSICAL EXAM     GEN: Alert and oriented,well appearing, no acute distress  SKIN: warm, dry to touch, no rashes or lesions in visible areas  PSYCH: mood and affect normal, judgement normal  EYES: Conjunctiva clear, lids normal,pupils equal round and reactive  ENMT: Normal external nose and ears,EACs normal appearing, TM pearly gray with normal light reflex bilaterally,nasal mucosa and turbinates normal appearing without erythema or edema, lips without lesions,good dentition,oropharynx clear  Neck : Trachea midline, no masses or swelling, no thyromegaly  LYMPHATIC : No cervical or supraclavicular lymphadenopathy  RESPIRATORY : Unlabored respiratory effort, no distress noted, clear to auscultation bilaterally, no wheeze, rhonchi, crackles  CARDIOVASCULAR: RRR, S1 S2 normal, no murmurs    Assessment and Plan:   The following treatment plan was discussed    1. Dyslipidemia  New diagnosis.Patient has elevated total cholesterol and triglycerides.Discussed with patient in length about the pathogenesis of atherosclerosis and the significance of elevated cholesterol and consequences including heart disease , pancreatitis etc.Discussed healthy diet , increasing fruits and vegetables, reducing saturated fat intake like fried , processed, fast food,processed meat etc.advised to start regular moderate intensity physical activity daily for atleast 30 -40 minutes with a target to lose weight.Given referral information for dietician.Will recheck Lipid Panel in 6 months.rx sent to start taking Lovaza or fish oil daily  Results for NAGA VILLAVICENCIO (MRN [id number]) as of 5/2/2017 17:59   Ref. Range 3/25/2017 07:47   Cholesterol,Tot Latest " Ref Range: 100-199 mg/dL 214 (H)   Triglycerides Latest Ref Range: 0-149 mg/dL 347 (H)   HDL Latest Ref Range: >=40 mg/dL 48   LDL Latest Ref Range: <100 mg/dL 97     - omega-3 acid ethyl esters (LOVAZA) 1 GM capsule; Take 1 Cap by mouth 4 times a day.  Dispense: 120 Cap; Refill: 1    2. Decreased visual acuity  New problem  Decreased visual acuity in clinic today  RE 20/40 LE 20/50  - REFERRAL TO OPTOMETRY    3. Prediabetes  New diagnosis  HbA1C 5.8.Patient is known to have hx of gestational diabetes.Counselled extensively about the risks with prediabetes.Discussed diet and exercise and weight loss as in plan #1  Will repeat in A1C 6 months    Hand outs given in German      Followup: Return in about 6 months (around 11/2/2017).         Please note that this dictation was created using voice recognition software. I have made every reasonable attempt to correct obvious errors, but I expect that there are errors of grammar and possibly content that I did not discover before finalizing the note.

## 2017-05-04 ENCOUNTER — TELEPHONE (OUTPATIENT)
Dept: MEDICAL GROUP | Facility: PHYSICIAN GROUP | Age: 32
End: 2017-05-04

## 2017-11-02 ENCOUNTER — OFFICE VISIT (OUTPATIENT)
Dept: MEDICAL GROUP | Facility: PHYSICIAN GROUP | Age: 32
End: 2017-11-02
Payer: COMMERCIAL

## 2017-11-02 VITALS
TEMPERATURE: 96.8 F | OXYGEN SATURATION: 96 % | SYSTOLIC BLOOD PRESSURE: 108 MMHG | WEIGHT: 140 LBS | DIASTOLIC BLOOD PRESSURE: 70 MMHG | HEART RATE: 94 BPM | BODY MASS INDEX: 32.4 KG/M2 | HEIGHT: 55 IN

## 2017-11-02 DIAGNOSIS — R73.03 PREDIABETES: ICD-10-CM

## 2017-11-02 DIAGNOSIS — J06.9 VIRAL URI WITH COUGH: ICD-10-CM

## 2017-11-02 DIAGNOSIS — E78.1 HYPERTRIGLYCERIDEMIA: ICD-10-CM

## 2017-11-02 PROCEDURE — 99214 OFFICE O/P EST MOD 30 MIN: CPT | Performed by: FAMILY MEDICINE

## 2017-11-02 RX ORDER — LORATADINE 10 MG/1
10 TABLET, ORALLY DISINTEGRATING ORAL DAILY
Qty: 30 TAB | Refills: 0 | Status: SHIPPED | OUTPATIENT
Start: 2017-11-02 | End: 2022-03-09

## 2017-11-02 ASSESSMENT — PAIN SCALES - GENERAL: PAINLEVEL: 4=SLIGHT-MODERATE PAIN

## 2017-11-02 NOTE — PROGRESS NOTES
"Subjective:   Jyotsna Franks is a 32 y.o. female here today for flu like symptoms    HPI :     Patient is here with complaint of having sore throat, nasal congestion and dry cough that started yesterday.No fever, chills.Assoicated with hoarseness, denies feeling SOB.No sick contacts.Has not been taking any medications.    She is also due for labs including A1C and Lipid Panel.She had high tg level and was prediabetic on last labs.She has modified her diet, reducing, rice, tortilla , fried food.She has also been trying to walk but she is also a busy mom.    Current medicines (including changes today)  Current Outpatient Prescriptions   Medication Sig Dispense Refill   • loratadine (CLARITIN REDITABS) 10 MG dissolvable tablet Take 1 Tab by mouth every day. 30 Tab 0   • omega-3 acid ethyl esters (LOVAZA) 1 GM capsule Take 1 Cap by mouth 4 times a day. 120 Cap 1   • omeprazole (PRILOSEC) 20 MG delayed-release capsule Take 1 Cap by mouth every morning before breakfast. 30 Cap 5     No current facility-administered medications for this visit.      She  has a past medical history of Gestational diabetes.    ROS   No chest pain, no shortness of breath, no abdominal pain  No vomiting, diarrhea       Objective:     Blood pressure 108/70, pulse 94, temperature 36 °C (96.8 °F), height 1.372 m (4' 6\"), weight 63.5 kg (140 lb), last menstrual period 01/06/2014, SpO2 96 %. Body mass index is 33.76 kg/m².     PHYSICAL EXAM     GEN: Alert and oriented,well appearing, no acute distress  SKIN: warm, dry to touch, no rashes or lesions in visible areas  PSYCH: mood and affect normal, judgement normal  EYES: Conjunctiva clear, lids normal,pupils equal round and reactive  ENMT: Normal external nose and ears,EACs normal appearing, TM pearly gray with normal light reflex bilaterally,nasal mucosa and turbinates erythematous and congested, lips without lesions,good dentition,oropharynx clear  Neck : Trachea midline, no masses or " swelling, no thyromegaly  LYMPHATIC : No cervical or supraclavicular lymphadenopathy  RESPIRATORY : Unlabored respiratory effort, no distress noted, clear to auscultation bilaterally, no wheeze, rhonchi, crackles  CARDIOVASCULAR: RRR, S1 S2 normal, no murmurs no edema of the extremities  MUSCULOSKELETAL : Normal gait and stance, no obvious abnormalities   NEURO: No overt focal neurologic deficits,sensation intact        Assessment and Plan:   The following treatment plan was discussed    1. Viral URI with cough  New problem.  Take Claritin 10 mg daily  Advised to take tylenol prn  Do steam inhalations / hot showers.   Use saline nasal sprays for irrigation of nasal congestion.   OTC cough drops / Robitussin   PO fluids encouraged.   Also may use warm fluids, warm tea and honey. .   Avoid allergens.No antibiotics needed.If symptoms worsen, call our office/return to clinic   - loratadine (CLARITIN REDITABS) 10 MG dissolvable tablet; Take 1 Tab by mouth every day.  Dispense: 30 Tab; Refill: 0    2. Hypertriglyceridemia  Will repeat lipid panel  - LIPID PROFILE; Future    3. Prediabetes  Discussed continuing lifestyle changes.Repeat A1C  Last A1C 5.9  - HEMOGLOBIN A1C; Future        Followup: Return in about 1 year (around 11/2/2018), or if symptoms worsen or fail to improve.         Please note that this dictation was created using voice recognition software. I have made every reasonable attempt to correct obvious errors, but I expect that there are errors of grammar and possibly content that I did not discover before finalizing the note.

## 2019-05-14 ENCOUNTER — OFFICE VISIT (OUTPATIENT)
Dept: URGENT CARE | Facility: PHYSICIAN GROUP | Age: 34
End: 2019-05-14
Payer: COMMERCIAL

## 2019-05-14 VITALS
TEMPERATURE: 97.6 F | HEIGHT: 58 IN | HEART RATE: 76 BPM | SYSTOLIC BLOOD PRESSURE: 110 MMHG | BODY MASS INDEX: 27.71 KG/M2 | RESPIRATION RATE: 14 BRPM | WEIGHT: 132 LBS | DIASTOLIC BLOOD PRESSURE: 74 MMHG | OXYGEN SATURATION: 99 %

## 2019-05-14 DIAGNOSIS — M54.2 NECK PAIN ON RIGHT SIDE: ICD-10-CM

## 2019-05-14 DIAGNOSIS — M62.838 NECK MUSCLE SPASM: ICD-10-CM

## 2019-05-14 PROCEDURE — 99204 OFFICE O/P NEW MOD 45 MIN: CPT | Performed by: NURSE PRACTITIONER

## 2019-05-14 RX ORDER — METHYLPREDNISOLONE 4 MG/1
TABLET ORAL
Qty: 1 KIT | Refills: 0 | Status: SHIPPED | OUTPATIENT
Start: 2019-05-14 | End: 2022-03-09

## 2019-05-14 RX ORDER — CYCLOBENZAPRINE HCL 5 MG
5-10 TABLET ORAL 3 TIMES DAILY PRN
Qty: 15 TAB | Refills: 0 | Status: SHIPPED | OUTPATIENT
Start: 2019-05-14 | End: 2019-05-19

## 2019-05-14 RX ORDER — KETOROLAC TROMETHAMINE 30 MG/ML
30 INJECTION, SOLUTION INTRAMUSCULAR; INTRAVENOUS ONCE
Status: COMPLETED | OUTPATIENT
Start: 2019-05-14 | End: 2019-05-14

## 2019-05-14 RX ADMIN — KETOROLAC TROMETHAMINE 30 MG: 30 INJECTION, SOLUTION INTRAMUSCULAR; INTRAVENOUS at 19:47

## 2019-05-15 ASSESSMENT — ENCOUNTER SYMPTOMS
PHOTOPHOBIA: 0
HEADACHES: 0
NECK PAIN: 1
BRUISES/BLEEDS EASILY: 0
FALLS: 0
TINGLING: 0
FEVER: 0
DOUBLE VISION: 0
SHORTNESS OF BREATH: 0
CHILLS: 0
NAUSEA: 0
BLURRED VISION: 0
VOMITING: 0
DIZZINESS: 0
WEAKNESS: 0
MYALGIAS: 1
SENSORY CHANGE: 0

## 2019-05-15 NOTE — PROGRESS NOTES
Subjective:      Jyotsna Franks is a 33 y.o. female who presents with Neck Pain (R side neck pain after waking up sunday morning)            HPI  States woke up with acute right sided neck muscle pain. States possibly slept wrong on neck sleeping on her side. Using IcyHot, Tylenol, last dose 5 hrs ago, but states not working. Denies HA, dizziness. No previous injury to neck. Denies numbness/tingling in upper extremities. Unable to move neck side to side.     PMH:  has a past medical history of Gestational diabetes.  MEDS:   Current Outpatient Prescriptions:   •  loratadine (CLARITIN REDITABS) 10 MG dissolvable tablet, Take 1 Tab by mouth every day. (Patient not taking: Reported on 5/14/2019), Disp: 30 Tab, Rfl: 0  •  omega-3 acid ethyl esters (LOVAZA) 1 GM capsule, Take 1 Cap by mouth 4 times a day. (Patient not taking: Reported on 5/14/2019), Disp: 120 Cap, Rfl: 1  •  omeprazole (PRILOSEC) 20 MG delayed-release capsule, Take 1 Cap by mouth every morning before breakfast. (Patient not taking: Reported on 5/14/2019), Disp: 30 Cap, Rfl: 5  ALLERGIES:   Allergies   Allergen Reactions   • Pcn [Penicillins] Anaphylaxis     SURGHX: No past surgical history on file.  SOCHX:  reports that she has never smoked. She has never used smokeless tobacco. She reports that she drinks alcohol. She reports that she does not use drugs.  FH: Family history was reviewed, no pertinent findings to report    Review of Systems   Constitutional: Negative for chills, fever and malaise/fatigue.   HENT: Negative for ear pain.    Eyes: Negative for blurred vision, double vision and photophobia.   Respiratory: Negative for shortness of breath.    Gastrointestinal: Negative for nausea and vomiting.   Musculoskeletal: Positive for joint pain, myalgias and neck pain. Negative for falls.   Skin: Negative for itching and rash.   Neurological: Negative for dizziness, tingling, sensory change, weakness and headaches.   Endo/Heme/Allergies:  "Does not bruise/bleed easily.   All other systems reviewed and are negative.         Objective:     /74 (BP Location: Left arm, Patient Position: Sitting, BP Cuff Size: Small adult)   Pulse 76   Temp 36.4 °C (97.6 °F) (Temporal)   Resp 14   Ht 1.473 m (4' 10\")   Wt 59.9 kg (132 lb)   SpO2 99%   BMI 27.59 kg/m²      Physical Exam   Constitutional: She is oriented to person, place, and time. Vital signs are normal. She appears well-developed and well-nourished. She is active and cooperative.  Non-toxic appearance. She does not have a sickly appearance. She does not appear ill. No distress.   HENT:   Head: Normocephalic.   Eyes: Pupils are equal, round, and reactive to light. EOM are normal.   Neck: Neck supple. Muscular tenderness present. No spinous process tenderness present. No neck rigidity. Decreased range of motion present. No edema and no erythema present.       Cardiovascular: Normal rate.    Pulmonary/Chest: Effort normal. No accessory muscle usage. No respiratory distress.   Musculoskeletal: She exhibits tenderness. She exhibits no edema or deformity.        Cervical back: She exhibits decreased range of motion, tenderness, pain and spasm. She exhibits no bony tenderness, no swelling, no edema and no deformity.   Neurological: She is alert and oriented to person, place, and time.   Skin: Skin is warm and dry. No rash noted. She is not diaphoretic. No erythema.   Vitals reviewed.              Assessment/Plan:     1. Neck pain on right side    - ketorolac (TORADOL) injection 30 mg; 1 mL by Intramuscular route Once.  - MethylPREDNISolone (MEDROL DOSEPAK) 4 MG Tablet Therapy Pack; Use as directed  Dispense: 1 Kit; Refill: 0    2. Neck muscle spasm    - cyclobenzaprine (FLEXERIL) 5 MG tablet; Take 1-2 Tabs by mouth 3 times a day as needed for Muscle Spasms for up to 5 days. Causes drowsiness, do not drive or work while using.  Dispense: 15 Tab; Refill: 0    Take NSAID prn for discomfort  May use cool " compresses for swelling and warm compresses for muscle stiffness   May perform muscle stretches as tolerated after warm compresses to maintain mobility, avoid heavy lifting  May continue Flexeril prn when at home only   May apply topical analgesics prn  Perform proper body mechanics with lift/carry, push/pull. Ask for assistance with heavy objects prn  Monitor for numbness/tingling in upper extremities, decreased ROM with lifting difficulty, HA, dizziness- need re-evaluation

## 2020-02-27 ENCOUNTER — OFFICE VISIT (OUTPATIENT)
Dept: URGENT CARE | Facility: PHYSICIAN GROUP | Age: 35
End: 2020-02-27
Payer: COMMERCIAL

## 2020-02-27 VITALS
HEART RATE: 89 BPM | TEMPERATURE: 98.4 F | OXYGEN SATURATION: 98 % | RESPIRATION RATE: 20 BRPM | HEIGHT: 57 IN | BODY MASS INDEX: 31.54 KG/M2 | DIASTOLIC BLOOD PRESSURE: 68 MMHG | WEIGHT: 146.2 LBS | SYSTOLIC BLOOD PRESSURE: 100 MMHG

## 2020-02-27 DIAGNOSIS — J01.90 ACUTE NON-RECURRENT SINUSITIS, UNSPECIFIED LOCATION: ICD-10-CM

## 2020-02-27 DIAGNOSIS — H10.9 CONJUNCTIVITIS OF BOTH EYES, UNSPECIFIED CONJUNCTIVITIS TYPE: ICD-10-CM

## 2020-02-27 PROCEDURE — 99214 OFFICE O/P EST MOD 30 MIN: CPT | Performed by: NURSE PRACTITIONER

## 2020-02-27 RX ORDER — POLYMYXIN B SULFATE AND TRIMETHOPRIM 1; 10000 MG/ML; [USP'U]/ML
1 SOLUTION OPHTHALMIC EVERY 4 HOURS
Qty: 1 BOTTLE | Refills: 0 | Status: SHIPPED | OUTPATIENT
Start: 2020-02-27 | End: 2020-03-05

## 2020-02-27 RX ORDER — DOXYCYCLINE HYCLATE 100 MG
100 TABLET ORAL 2 TIMES DAILY
Qty: 14 TAB | Refills: 0 | Status: SHIPPED | OUTPATIENT
Start: 2020-02-27 | End: 2020-03-05

## 2020-02-27 ASSESSMENT — ENCOUNTER SYMPTOMS
EYE ITCHING: 1
RESPIRATORY NEGATIVE: 1
CARDIOVASCULAR NEGATIVE: 1
NEUROLOGICAL NEGATIVE: 1
SINUS PAIN: 1
FEVER: 0
CHILLS: 0
EYE REDNESS: 1
SHORTNESS OF BREATH: 0
BLURRED VISION: 0
EYE DISCHARGE: 1

## 2020-02-27 NOTE — PROGRESS NOTES
Subjective:     Jyotsna Franks is a 34 y.o. female who presents for Pink Eye (itchy and redness x15 days, with congestion )       Eye Problem    Both eyes are affected.This is a new problem. Episode onset: 15 days ago. The problem has been gradually worsening. There was no injury mechanism. The pain is mild. Associated symptoms include an eye discharge (Crusty), eye redness and itching. Pertinent negatives include no blurred vision or fever. She has tried eye drops (Zyrtec) for the symptoms. The treatment provided no relief.     PMH:  has a past medical history of Gestational diabetes.    MEDS:   Current Outpatient Medications:   •  doxycycline (VIBRAMYCIN) 100 MG Tab, Take 1 Tab by mouth 2 times a day for 7 days., Disp: 14 Tab, Rfl: 0  •  polymixin-trimethoprim (POLYTRIM) 99625-2.1 UNIT/ML-% Solution, Place 1 Drop in both eyes every 4 hours for 7 days., Disp: 1 Bottle, Rfl: 0  •  MethylPREDNISolone (MEDROL DOSEPAK) 4 MG Tablet Therapy Pack, Use as directed (Patient not taking: Reported on 2/27/2020), Disp: 1 Kit, Rfl: 0  •  loratadine (CLARITIN REDITABS) 10 MG dissolvable tablet, Take 1 Tab by mouth every day. (Patient not taking: Reported on 5/14/2019), Disp: 30 Tab, Rfl: 0  •  omega-3 acid ethyl esters (LOVAZA) 1 GM capsule, Take 1 Cap by mouth 4 times a day. (Patient not taking: Reported on 5/14/2019), Disp: 120 Cap, Rfl: 1  •  omeprazole (PRILOSEC) 20 MG delayed-release capsule, Take 1 Cap by mouth every morning before breakfast. (Patient not taking: Reported on 5/14/2019), Disp: 30 Cap, Rfl: 5    ALLERGIES:   Allergies   Allergen Reactions   • Pcn [Penicillins] Anaphylaxis     SURGHX: History reviewed. No pertinent surgical history.    SOCHX:  reports that she has never smoked. She has never used smokeless tobacco. She reports current alcohol use. She reports that she does not use drugs.     FH: Reviewed with patient, not pertinent to this visit.    Review of Systems   Constitutional: Positive for  "malaise/fatigue. Negative for chills and fever.   HENT: Positive for congestion and sinus pain.    Eyes: Positive for discharge (Crusty), redness and itching. Negative for blurred vision.   Respiratory: Negative.  Negative for shortness of breath.    Cardiovascular: Negative.    Neurological: Negative.    All other systems reviewed and are negative.    Additional details per HPI.    Objective:     /68 (BP Location: Left arm, Patient Position: Sitting, BP Cuff Size: Adult)   Pulse 89   Temp 36.9 °C (98.4 °F) (Temporal)   Resp 20   Ht 1.448 m (4' 9\")   Wt 66.3 kg (146 lb 3.2 oz)   SpO2 98%   BMI 31.64 kg/m²     Physical Exam  Vitals signs reviewed.   Constitutional:       General: She is not in acute distress.     Appearance: She is well-developed. She is not toxic-appearing or diaphoretic.   HENT:      Head: Normocephalic.      Right Ear: Tympanic membrane and external ear normal.      Left Ear: Tympanic membrane and external ear normal.      Nose: Nasal tenderness, mucosal edema and rhinorrhea present.      Mouth/Throat:      Mouth: Mucous membranes are moist.      Pharynx: Oropharynx is clear. Uvula midline.      Comments: Postnasal drip  Eyes:      Extraocular Movements: Extraocular movements intact.      Conjunctiva/sclera: Conjunctivae normal.      Pupils: Pupils are equal, round, and reactive to light.   Neck:      Musculoskeletal: Normal range of motion.   Cardiovascular:      Rate and Rhythm: Normal rate and regular rhythm.      Heart sounds: Normal heart sounds.   Pulmonary:      Effort: Pulmonary effort is normal. No respiratory distress.      Breath sounds: Normal breath sounds. No decreased breath sounds, wheezing, rhonchi or rales.   Abdominal:      General: Bowel sounds are normal.      Palpations: Abdomen is soft.      Tenderness: There is no abdominal tenderness.   Musculoskeletal: Normal range of motion.         General: No deformity.   Lymphadenopathy:      Cervical: No cervical " adenopathy.   Skin:     General: Skin is warm and dry.      Coloration: Skin is not pale.   Neurological:      Mental Status: She is alert and oriented to person, place, and time.      Sensory: No sensory deficit.      Coordination: Coordination normal.   Psychiatric:         Behavior: Behavior normal. Behavior is cooperative.          Assessment/Plan:     1. Conjunctivitis of both eyes, unspecified conjunctivitis type  - polymixin-trimethoprim (POLYTRIM) 72676-5.1 UNIT/ML-% Solution; Place 1 Drop in both eyes every 4 hours for 7 days.  Dispense: 1 Bottle; Refill: 0    2. Acute non-recurrent sinusitis, unspecified location  - doxycycline (VIBRAMYCIN) 100 MG Tab; Take 1 Tab by mouth 2 times a day for 7 days.  Dispense: 14 Tab; Refill: 0    Various differentials discussed including allergic vs viral vs bacterial etiologies.    Rx as above sent electronically.    Discussed close monitoring and supportive measures including increasing fluids and rest as well as OTC symptom management per 's instructions.     Advised to obtain discharge summary at the  prior to leaving.    Vital signs stable, afebrile, asymptomatic, no acute distress.    Warning signs reviewed. Return precautions discussed.     Patient advised to: Return for 1) Symptoms don't improve or worsen, or go to ER, 2) Follow up with primary care in 7-10 days.    Differential diagnosis, natural history, supportive care, and indications for immediate follow-up discussed. All questions answered. Patient agrees with the plan of care.

## 2021-01-26 ENCOUNTER — GYNECOLOGY VISIT (OUTPATIENT)
Dept: OBGYN | Facility: CLINIC | Age: 36
End: 2021-01-26
Payer: COMMERCIAL

## 2021-01-26 ENCOUNTER — HOSPITAL ENCOUNTER (OUTPATIENT)
Facility: MEDICAL CENTER | Age: 36
End: 2021-01-26
Attending: OBSTETRICS & GYNECOLOGY
Payer: COMMERCIAL

## 2021-01-26 VITALS — BODY MASS INDEX: 33.33 KG/M2 | SYSTOLIC BLOOD PRESSURE: 147 MMHG | WEIGHT: 154 LBS | DIASTOLIC BLOOD PRESSURE: 93 MMHG

## 2021-01-26 DIAGNOSIS — N94.10 DYSPAREUNIA IN FEMALE: ICD-10-CM

## 2021-01-26 DIAGNOSIS — R10.2 PELVIC PAIN: ICD-10-CM

## 2021-01-26 DIAGNOSIS — Z12.4 CERVICAL CANCER SCREENING: ICD-10-CM

## 2021-01-26 PROCEDURE — 87624 HPV HI-RISK TYP POOLED RSLT: CPT

## 2021-01-26 PROCEDURE — 88175 CYTOPATH C/V AUTO FLUID REDO: CPT

## 2021-01-26 PROCEDURE — 99395 PREV VISIT EST AGE 18-39: CPT | Performed by: OBSTETRICS & GYNECOLOGY

## 2021-01-26 NOTE — NON-PROVIDER
Pt here for new pt appt  Pt states cramping before periods start, breast pain & Mood Swings during periods. And low sex drive. Pt states she had irregular periods.   Pharmacy verified  Good #: [phone]  LMP:  01/20/2021   PAP: 7 yrs ago    BC:  had vesectomy

## 2021-01-27 LAB
CYTOLOGY REG CYTOL: NORMAL
HPV HR 12 DNA CVX QL NAA+PROBE: NEGATIVE
HPV16 DNA SPEC QL NAA+PROBE: NEGATIVE
HPV18 DNA SPEC QL NAA+PROBE: NEGATIVE
SPECIMEN SOURCE: NORMAL

## 2021-01-28 NOTE — PROGRESS NOTES
Chief complaint: Annual exam    Jyotsna Franks is a 35 y.o.  female who presents for her Annual Gynecologic Exam      HPI Comments: Pt presents for her annual well woman exam.   Patient reports that she has been having issues for the last 6 months or so.  She reports some cramping before and after her cycles lasting for roughly 5 days or so.  She also reports pain with intercourse.  She reports that the deep pain in her vaginal area.  She has tried lubricants which have improved her symptoms but still experiences pain at times during intercourse.  She also does report significant pain after orgasm.  She reports did not have these issues last year.  Symptoms have been progressively worsening since then.        Review of Systems:   Pertinent positives documented in HPI and all other systems reviewed & are negative    PGYN Hx: Pelvic pain, dyspareunia    All PMH, PSH, allergies, social history and FH reviewed and updated today:  Past Medical History:   Diagnosis Date   • Gestational diabetes    • Hyperlipidemia        History reviewed. No pertinent surgical history.    Medications:   Current Outpatient Medications Ordered in Epic   Medication Sig Dispense Refill   • MethylPREDNISolone (MEDROL DOSEPAK) 4 MG Tablet Therapy Pack Use as directed (Patient not taking: Reported on 2020) 1 Kit 0   • loratadine (CLARITIN REDITABS) 10 MG dissolvable tablet Take 1 Tab by mouth every day. (Patient not taking: Reported on 2019) 30 Tab 0   • omega-3 acid ethyl esters (LOVAZA) 1 GM capsule Take 1 Cap by mouth 4 times a day. (Patient not taking: Reported on 2019) 120 Cap 1   • omeprazole (PRILOSEC) 20 MG delayed-release capsule Take 1 Cap by mouth every morning before breakfast. (Patient not taking: Reported on 2019) 30 Cap 5     No current Epic-ordered facility-administered medications on file.        Pcn [penicillins]    Social History     Socioeconomic History   • Marital status:       Spouse name: Not on file   • Number of children: Not on file   • Years of education: Not on file   • Highest education level: Not on file   Occupational History   • Not on file   Social Needs   • Financial resource strain: Not on file   • Food insecurity     Worry: Not on file     Inability: Not on file   • Transportation needs     Medical: Not on file     Non-medical: Not on file   Tobacco Use   • Smoking status: Never Smoker   • Smokeless tobacco: Never Used   Substance and Sexual Activity   • Alcohol use: Yes     Comment: occasional   • Drug use: No   • Sexual activity: Yes     Partners: Male   Lifestyle   • Physical activity     Days per week: Not on file     Minutes per session: Not on file   • Stress: Not on file   Relationships   • Social connections     Talks on phone: Not on file     Gets together: Not on file     Attends [employer] service: Not on file     Active member of club or organization: Not on file     Attends meetings of clubs or organizations: Not on file     Relationship status: Not on file   • Intimate partner violence     Fear of current or ex partner: Not on file     Emotionally abused: Not on file     Physically abused: Not on file     Forced sexual activity: Not on file   Other Topics Concern   • Not on file   Social History Narrative   • Not on file       Family History   Problem Relation Age of Onset   • Diabetes Mother    • Heart Disease Maternal Grandmother    • Cancer Maternal Grandmother         Throat Cancer          Objective:   Vital measurements:  /93   Wt 69.9 kg (154 lb)   Body mass index is 33.33 kg/m². (Goal BM I>18 <25)    Physical Exam   Nursing note and vitals reviewed.  Constitutional: She is oriented to person, place, and time. She appears well-developed and well-nourished. No distress.     HEENT:   Head: Normocephalic and atraumatic.   Right Ear: External ear normal.   Left Ear: External ear normal.   Nose: Nose normal.   Eyes: Conjunctivae and EOM are normal.  Pupils are equal, round, and reactive to light. No scleral icterus.     Neck: Normal range of motion. Neck supple. No tracheal deviation present. No thyromegaly present. No cervical or supraclavicular lymphadenopathy.    Pulmonary/Chest: Effort normal and breath sounds normal. No respiratory distress. She has no wheezes. She has no rales. She exhibits no tenderness.     Cardiovascular: Regular, rate and rhythm. No edema.    Breast: Symmetrical, normal consistency without masses., No dimpling or skin changes, Normal nipples without discharge, no axillary lymphadenopathy    Abdominal: Soft. Bowel sounds are normal. She exhibits no distension and no mass. No tenderness. She has no rebound and no guarding.     Genitourinary:  Pelvic exam was performed with patient supine.  External genitalia with no abnormal pigmentation, labial fusion, rashes, tenderness, lesions or injury to the labia bilaterally.  BUS normal  Vagina is pink and moist with no lesions, foul discharge, erythema, tenderness or bleeding. No foreign body around the vagina or signs of injury.   Cervix exhibits no motion tenderness, no discharge and no friability, no lesions.   Uterus is 8 cm and not deviated, not enlarged, not fixed.  It is significant tender to palpation during examination.  She reports that this is the pain that she feels during intercourse..  There is all significant prolapse identified.  Leading edge of the cervix at the level of the introitus.  No urethral hypermobility noted.  No loss of urine with Valsalva  Right adnexa displays no mass, no tenderness and no fullness.  Left adnexa displays no mass, no tenderness and no fullness.     Musculoskeletal: Normal range of motion. Non tender. She exhibits no edema and no tenderness.     Lymphadenopathy: She has no cervical or supraclavicular adenopathy.     Neurological: She is alert and oriented to person, place, and time. She exhibits normal muscle tone.     Skin: Skin is warm and dry. No  rash noted. She is not diaphoretic. No erythema. No pallor.     Psychiatric: She has a normal mood and affect. Her behavior is normal. Judgment and thought content normal.        Assessment:     1. Cervical cancer screening  THINPREP PAP WITH HPV   2. Pelvic pain  US-PELVIC COMPLETE (TRANSABDOMINAL/TRANSVAGINAL) (COMBO)         Plan:   Pap and physical exam performed.  Will call patient with results  Self breast awareness discussed.  Encouraged exercise and proper diet.  Mammograms starting @ age 40 annually.  See medications and orders placed in encounter report.  Follow up in 1 year for annual exam or sooner as needed    Discussed with patient findings on examination.  Significant prolapse is seen.  All significant palpation do bimanual examination especially over the uterus.  The prolapse may be the cause of her symptoms as the leading edge is very close to the introitus and she reports similar pain on bimanual examination as she feels during intercourse.  Pain appears to be localized to the uterus.    We will obtain ultrasound at this time to assess for any other possibilities.  Had a quick discussion with the patient regarding options for management but will recommend surgical management in the form of a hysterectomy at next visit if ultrasound does not show any other causes    Questions answered

## 2021-02-08 ENCOUNTER — HOSPITAL ENCOUNTER (OUTPATIENT)
Dept: RADIOLOGY | Facility: MEDICAL CENTER | Age: 36
End: 2021-02-08
Attending: OBSTETRICS & GYNECOLOGY
Payer: COMMERCIAL

## 2021-02-08 DIAGNOSIS — R10.2 PELVIC PAIN: ICD-10-CM

## 2021-02-08 PROCEDURE — 76830 TRANSVAGINAL US NON-OB: CPT

## 2021-02-10 ENCOUNTER — GYNECOLOGY VISIT (OUTPATIENT)
Dept: OBGYN | Facility: CLINIC | Age: 36
End: 2021-02-10
Payer: COMMERCIAL

## 2021-02-10 VITALS — BODY MASS INDEX: 33.11 KG/M2 | WEIGHT: 153 LBS | DIASTOLIC BLOOD PRESSURE: 88 MMHG | SYSTOLIC BLOOD PRESSURE: 140 MMHG

## 2021-02-10 DIAGNOSIS — R93.89 THICKENED ENDOMETRIUM: ICD-10-CM

## 2021-02-10 DIAGNOSIS — R10.2 PELVIC PAIN: ICD-10-CM

## 2021-02-10 PROCEDURE — 99213 OFFICE O/P EST LOW 20 MIN: CPT | Performed by: OBSTETRICS & GYNECOLOGY

## 2021-02-10 NOTE — NON-PROVIDER
Pt here to discuss u/s results   Pt states no concerns   Good # [phone]  Pharmacy verified.

## 2021-02-10 NOTE — PROGRESS NOTES
Chief complaint: Return visit      History of present illness: 35 y.o. presents to office for discussion of ultrasound results and plan of care    Patient still reporting significant pelvic pain since last visit.  Not much improvement since last visit.      Review of systems:  Pertinent positives documented in HPI and a comprehensive review of system is negative    All PMH, PSH, allergies, social history and FH reviewed and updated today:  Past Medical History:   Diagnosis Date   • Gestational diabetes    • Hyperlipidemia        History reviewed. No pertinent surgical history.    Allergies:   Allergies   Allergen Reactions   • Pcn [Penicillins] Anaphylaxis       Social History     Socioeconomic History   • Marital status:      Spouse name: Not on file   • Number of children: Not on file   • Years of education: Not on file   • Highest education level: Not on file   Occupational History   • Not on file   Tobacco Use   • Smoking status: Never Smoker   • Smokeless tobacco: Never Used   Substance and Sexual Activity   • Alcohol use: Yes     Comment: occasional   • Drug use: No   • Sexual activity: Yes     Partners: Male   Other Topics Concern   • Not on file   Social History Narrative   • Not on file     Social Determinants of Health     Financial Resource Strain:    • Difficulty of Paying Living Expenses:    Food Insecurity:    • Worried About Running Out of Food in the Last Year:    • Ran Out of Food in the Last Year:    Transportation Needs:    • Lack of Transportation (Medical):    • Lack of Transportation (Non-Medical):    Physical Activity:    • Days of Exercise per Week:    • Minutes of Exercise per Session:    Stress:    • Feeling of Stress :    Social Connections:    • Frequency of Communication with Friends and Family:    • Frequency of Social Gatherings with Friends and Family:    • Attends [employer] Services:    • Active Member of Clubs or Organizations:    • Attends Club or Organization Meetings:    •  Marital Status:    Intimate Partner Violence:    • Fear of Current or Ex-Partner:    • Emotionally Abused:    • Physically Abused:    • Sexually Abused:        Family History   Problem Relation Age of Onset   • Diabetes Mother    • Heart Disease Maternal Grandmother    • Cancer Maternal Grandmother         Throat Cancer       Physical exam:  /88   Wt 69.4 kg (153 lb)     GENERAL APPEARANCE: healthy, alert, no distress  NEURO Awake, alert and oriented x 3, Normal gait, no sensory deficits  SKIN No rashes, or ulcers or lesions seen  PSYCHIATRIC: Patient shows appropriate affect, is alert and oriented x3, intact judgment and insight.      2/8/2021 11:35 AM     HISTORY/REASON FOR EXAM:  Pain     TECHNIQUE/EXAM DESCRIPTION:  Transabdominal and transvaginal pelvic ultrasound.     COMPARISON:   CT dated 7/6/2011     FINDINGS:  Both transabdominal and transvaginal scanning were performed to optimally visualize the pelvis.     The uterus measures 4.25 cm x 8.41 cm x 6.39 cm.  The uterine myometrium is within normal limits.  The endometrial echo complex measures 1.47 cm. Heterogeneous soft tissue in the endometrial cavity with some internal vascularity.     Low resistive waveforms are confirmed within the ovaries.  The right ovary measures 2.95 cm x 1.92 cm x 2.26 cm.     The left ovary measures 5.74 cm x 2.60 cm x 4.06 cm. Dominant follicle measures 2.7 cm     There is no free fluid seen.     IMPRESSION:        1. Thickened, heterogeneous, 1.5 cm endometrial echo complex with internal vascularity. Consider further evaluation with hysteroscopy.  2. No myometrial mass lesions.  3. Normal ovaries.        Assessment:  1. Thickened endometrium     2. Pelvic pain         Plan:  Discussed with patient findings on ultrasound and once again during her last examination.  There appears to be a thickened endometrium with slight flow, likely a polyp or fibroid.    Discussed with patient options for treatment at this time both  pelvic physical therapy versus hysterectomy given her significant prolapse.  I did discuss with the patient that given the significant degree of prolapse, I do not believe that she would get the results that she is expecting from pelvic floor therapy and that a hysterectomy would be her best choice.  This was discussed with both her and her spouse.    If patient decides to undergo a hysterectomy, will perform endometrial biopsy prior.    If patient decides to go with medical therapy, I did discuss with the patient that he thickened endometrium needs to be evaluated and a hysteroscopy with D&C would be recommended at that time.    She will let us know of her decision    Questions answered    Spent  15 minutes in face-to-face patient contact in which greater than 50% of that visit was spent in counseling/coordination of care including medical and surgical options of care.

## 2021-02-11 ENCOUNTER — TELEPHONE (OUTPATIENT)
Dept: OBGYN | Facility: CLINIC | Age: 36
End: 2021-02-11

## 2021-02-11 NOTE — TELEPHONE ENCOUNTER
Pt called triage line stating that she spoke to provider on 2/10/2021 about her surgery and she wants to go forward with the surgery per consult with Dr. Juan F Hanks Doctor will put referral through notified pt.

## 2021-03-05 ENCOUNTER — OFFICE VISIT (OUTPATIENT)
Dept: URGENT CARE | Facility: PHYSICIAN GROUP | Age: 36
End: 2021-03-05
Payer: COMMERCIAL

## 2021-03-05 VITALS
RESPIRATION RATE: 20 BRPM | WEIGHT: 150 LBS | HEIGHT: 58 IN | DIASTOLIC BLOOD PRESSURE: 84 MMHG | TEMPERATURE: 96.8 F | HEART RATE: 90 BPM | OXYGEN SATURATION: 95 % | BODY MASS INDEX: 31.49 KG/M2 | SYSTOLIC BLOOD PRESSURE: 124 MMHG

## 2021-03-05 DIAGNOSIS — H10.33 ACUTE BACTERIAL CONJUNCTIVITIS OF BOTH EYES: ICD-10-CM

## 2021-03-05 PROCEDURE — 99203 OFFICE O/P NEW LOW 30 MIN: CPT | Performed by: FAMILY MEDICINE

## 2021-03-05 RX ORDER — POLYMYXIN B SULFATE AND TRIMETHOPRIM 1; 10000 MG/ML; [USP'U]/ML
1 SOLUTION OPHTHALMIC 4 TIMES DAILY
Qty: 10 ML | Refills: 1 | Status: SHIPPED | OUTPATIENT
Start: 2021-03-05 | End: 2021-03-12

## 2021-03-05 ASSESSMENT — ENCOUNTER SYMPTOMS
DOUBLE VISION: 0
CHILLS: 0
EYE DISCHARGE: 1
EYE PAIN: 0
VOMITING: 0
SHORTNESS OF BREATH: 0
MYALGIAS: 0
FOREIGN BODY SENSATION: 0
SORE THROAT: 0
NAUSEA: 0
FEVER: 0
BLURRED VISION: 1
EYE REDNESS: 1
DIZZINESS: 0

## 2021-03-05 ASSESSMENT — VISUAL ACUITY: OU: 1

## 2021-03-05 NOTE — PROGRESS NOTES
Subjective:   Jyotsna Franks is a 35 y.o. female who presents for Eye Pain (Bilateral eye pain( itchy, teary, blurry vision; 8x days) )        Eye Pain   Both eyes are affected.This is a new problem. The current episode started in the past 7 days. The problem occurs constantly. The problem has been unchanged. There was no injury mechanism. She does not wear contacts. Associated symptoms include blurred vision, an eye discharge and eye redness. Pertinent negatives include no double vision, fever, foreign body sensation, nausea or vomiting. She has tried eye drops for the symptoms. The treatment provided no relief.     PMH:  has a past medical history of Gestational diabetes and Hyperlipidemia. She also has no past medical history of Addisons disease ([facility]), Adrenal disorder ([facility]), Allergy, Anemia, Anxiety, Arrhythmia, Arthritis, Asthma, Blood transfusion without reported diagnosis, Cancer ([facility]), Cataract, CHF (congestive heart failure) ([facility]), Clotting disorder ([facility]), COPD (chronic obstructive pulmonary disease) ([facility]), Cushings syndrome ([facility]), Depression, Diabetic neuropathy ([facility]), GERD (gastroesophageal reflux disease), Glaucoma, Goiter, Head ache, Heart attack ([facility]), Heart murmur, HIV (human immunodeficiency virus infection) ([facility]), Hypertension, IBD (inflammatory bowel disease), Kidney disease, Meningitis, Migraine, Muscle disorder, Osteoporosis, Parathyroid disorder ([facility]), Pituitary disease ([facility]), Pulmonary emphysema ([facility]), Seizure ([facility]), Sickle cell disease ([facility]), Stroke ([facility]), Substance abuse ([facility]), Thyroid disease, Tuberculosis, or Urinary tract infection.  MEDS:   Current Outpatient Medications:   •  polymixin-trimethoprim (POLYTRIM) 80294-5.1 UNIT/ML-% Solution, Administer 1 Drop into both eyes 4 times a day for 7 days., Disp: 10 mL, Rfl: 1  •  loratadine (CLARITIN REDITABS) 10 MG dissolvable tablet, Take 1 Tab by mouth every day., Disp: 30 Tab, Rfl: 0  •  MethylPREDNISolone (MEDROL DOSEPAK) 4 MG  "Tablet Therapy Pack, Use as directed (Patient not taking: Reported on 2/27/2020), Disp: 1 Kit, Rfl: 0  •  omega-3 acid ethyl esters (LOVAZA) 1 GM capsule, Take 1 Cap by mouth 4 times a day. (Patient not taking: Reported on 5/14/2019), Disp: 120 Cap, Rfl: 1  •  omeprazole (PRILOSEC) 20 MG delayed-release capsule, Take 1 Cap by mouth every morning before breakfast. (Patient not taking: Reported on 5/14/2019), Disp: 30 Cap, Rfl: 5  ALLERGIES:   Allergies   Allergen Reactions   • Pcn [Penicillins] Anaphylaxis     SURGHX: No past surgical history on file.  SOCHX:  reports that she has never smoked. She has never used smokeless tobacco. She reports current alcohol use. She reports that she does not use drugs.  FH:   Family History   Problem Relation Age of Onset   • Diabetes Mother    • Heart Disease Maternal Grandmother    • Cancer Maternal Grandmother         Throat Cancer     Review of Systems   Constitutional: Negative for chills and fever.   HENT: Negative for sore throat.    Eyes: Positive for blurred vision, discharge and redness. Negative for double vision and pain (complains \"itching\").   Respiratory: Negative for shortness of breath.    Gastrointestinal: Negative for nausea and vomiting.   Genitourinary: Negative for dysuria.   Musculoskeletal: Negative for myalgias.   Skin: Negative for rash.   Neurological: Negative for dizziness.        Objective:   /84 (BP Location: Left arm, Patient Position: Sitting, BP Cuff Size: Adult)   Pulse 90   Temp 36 °C (96.8 °F) (Temporal)   Resp 20   Ht 1.461 m (4' 9.5\")   Wt 68 kg (150 lb)   SpO2 95%   BMI 31.90 kg/m²   Physical Exam  Vitals and nursing note reviewed.   Constitutional:       General: She is not in acute distress.     Appearance: She is well-developed.   HENT:      Head: Normocephalic and atraumatic.      Right Ear: External ear normal.      Left Ear: External ear normal.      Nose: Nose normal.      Mouth/Throat:      Mouth: Mucous membranes are " moist.   Eyes:      General: Lids are normal. Vision grossly intact. Gaze aligned appropriately.         Right eye: No foreign body.         Left eye: No foreign body.      Conjunctiva/sclera:      Right eye: Right conjunctiva is injected. Exudate present.      Left eye: Left conjunctiva is injected. Exudate present.      Pupils: Pupils are equal, round, and reactive to light.   Cardiovascular:      Rate and Rhythm: Normal rate.   Pulmonary:      Effort: Pulmonary effort is normal. No respiratory distress.      Breath sounds: Normal breath sounds.   Abdominal:      General: There is no distension.   Musculoskeletal:         General: Normal range of motion.   Skin:     General: Skin is warm and dry.   Neurological:      General: No focal deficit present.      Mental Status: She is alert and oriented to person, place, and time. Mental status is at baseline.      Gait: Gait (gait at baseline) normal.   Psychiatric:         Judgment: Judgment normal.           Assessment/Plan:   1. Acute bacterial conjunctivitis of both eyes  - polymixin-trimethoprim (POLYTRIM) 85084-7.1 UNIT/ML-% Solution; Administer 1 Drop into both eyes 4 times a day for 7 days.  Dispense: 10 mL; Refill: 1        Medical Decision Making/Course:  Symptomatic and supportive measures discussed including eyelid hygiene behavior modification, warm compress as needed.  In the course of preparing for this visit with review of the pertinent past medical history, recent and past clinic visits, current medications, and in the further course of obtaining the current history pertinent to the clinic visit today, performing an exam and evaluation, ordering and independently evaluating labs, tests, and/or procedures, prescribing any recommended new medications, providing any pertinent counseling and education and recommending further coordination of care, at least 20 minutes of total time were spent during this encounter.      Discussed close monitoring, return  precautions, and supportive measures of maintaining adequate fluid hydration and caloric intake, relative rest and symptom management as needed for pain and/or fever.    Differential diagnosis, natural history, supportive care, and indications for immediate follow-up discussed.     Advised the patient to follow-up with the primary care physician for recheck, reevaluation, and consideration of further management.    Please note that this dictation was created using voice recognition software. I have worked with consultants from the vendor as well as technical experts from [employer] [employer] to optimize the interface. I have made every reasonable attempt to correct obvious errors, but I expect that there are errors of grammar and possibly content that I did not discover before finalizing the note.

## 2021-03-05 NOTE — PATIENT INSTRUCTIONS
"Conjunctivitis  Conjunctivitis is commonly called \"pink eye.\" Conjunctivitis can be caused by bacterial or viral infection, allergies, or injuries. There is usually redness of the lining of the eye, itching, discomfort, and sometimes discharge. There may be deposits of matter along the eyelids. A viral infection usually causes a watery discharge, while a bacterial infection causes a yellowish, thick discharge. Pink eye is very contagious and spreads by direct contact.  You may be given antibiotic eyedrops as part of your treatment. Before using your eye medicine, remove all drainage from the eye by washing gently with warm water and cotton balls. Continue to use the medication until you have awakened 2 mornings in a row without discharge from the eye. Do not rub your eye. This increases the irritation and helps spread infection. Use separate towels from other household members. Wash your hands with soap and water before and after touching your eyes. Use cold compresses to reduce pain and sunglasses to relieve irritation from light. Do not wear contact lenses or wear eye makeup until the infection is gone.  SEEK MEDICAL CARE IF:   · Your symptoms are not better after 3 days of treatment.  · You have increased pain or trouble seeing.  · The outer eyelids become very red or swollen.  Document Released: 01/25/2006 Document Revised: 03/11/2013 Document Reviewed: 12/18/2006  [employer]® Patient Information ©2014 [employer].    "

## 2021-05-04 ENCOUNTER — HOSPITAL ENCOUNTER (OUTPATIENT)
Facility: MEDICAL CENTER | Age: 36
End: 2021-05-04
Attending: OBSTETRICS & GYNECOLOGY
Payer: COMMERCIAL

## 2021-05-04 ENCOUNTER — GYNECOLOGY VISIT (OUTPATIENT)
Dept: OBGYN | Facility: CLINIC | Age: 36
End: 2021-05-04
Payer: COMMERCIAL

## 2021-05-04 VITALS — DIASTOLIC BLOOD PRESSURE: 74 MMHG | WEIGHT: 150 LBS | BODY MASS INDEX: 31.9 KG/M2 | SYSTOLIC BLOOD PRESSURE: 136 MMHG

## 2021-05-04 DIAGNOSIS — R93.89 THICKENED ENDOMETRIUM: ICD-10-CM

## 2021-05-04 DIAGNOSIS — N94.10 DYSPAREUNIA IN FEMALE: ICD-10-CM

## 2021-05-04 DIAGNOSIS — R10.2 PELVIC PAIN: ICD-10-CM

## 2021-05-04 LAB
AMBIGUOUS DTTM AMBI4: NORMAL
PATHOLOGY CONSULT NOTE: NORMAL

## 2021-05-04 PROCEDURE — 99999 PR NO CHARGE: CPT | Performed by: OBSTETRICS & GYNECOLOGY

## 2021-05-04 PROCEDURE — 88305 TISSUE EXAM BY PATHOLOGIST: CPT

## 2021-05-04 NOTE — PROGRESS NOTES
History and Physical Exam    Chief complaint: Preoperative visit      History of present illness: 35 y.o.  6 para 5-0-1-5 presents with pelvic pain and thick endometrium and desires surgical therapy. Risks, benefits and alternative therapies have been discussed and patient still desires surgical therapy. Questions answered.    Please see prior dictations for full history    Pertinent positives documented in HPI and all other systems reviewed & are negative      All PMH, PSH, allergies, social history and FH reviewed and updated today:  Past Medical History:   Diagnosis Date   • Gestational diabetes    • Hyperlipidemia        History reviewed. No pertinent surgical history.    No current facility-administered medications for this visit.       Allergies:   Allergies   Allergen Reactions   • Pcn [Penicillins] Anaphylaxis       Social History     Socioeconomic History   • Marital status:      Spouse name: Not on file   • Number of children: Not on file   • Years of education: Not on file   • Highest education level: Not on file   Occupational History   • Not on file   Tobacco Use   • Smoking status: Never Smoker   • Smokeless tobacco: Never Used   Substance and Sexual Activity   • Alcohol use: Yes     Comment: occasional   • Drug use: No   • Sexual activity: Yes     Partners: Male   Other Topics Concern   • Not on file   Social History Narrative   • Not on file     Social Determinants of Health     Financial Resource Strain:    • Difficulty of Paying Living Expenses:    Food Insecurity:    • Worried About Running Out of Food in the Last Year:    • Ran Out of Food in the Last Year:    Transportation Needs:    • Lack of Transportation (Medical):    • Lack of Transportation (Non-Medical):    Physical Activity:    • Days of Exercise per Week:    • Minutes of Exercise per Session:    Stress:    • Feeling of Stress :    Social Connections:    • Frequency of Communication with Friends and Family:    • Frequency  of Social Gatherings with Friends and Family:    • Attends Judaism Services:    • Active Member of Clubs or Organizations:    • Attends Club or Organization Meetings:    • Marital Status:    Intimate Partner Violence:    • Fear of Current or Ex-Partner:    • Emotionally Abused:    • Physically Abused:    • Sexually Abused:        Family History   Problem Relation Age of Onset   • Diabetes Mother    • Heart Disease Maternal Grandmother    • Cancer Maternal Grandmother         Throat Cancer       Physical exam:  /74   Wt 68 kg (150 lb)     GENERAL APPEARANCE: healthy, alert, no distress  NECK nontender, no masses, thyromegaly or nodules  HEART RRR with normal S1 and S2 ,no murmurs, no gallops, no peripheral edema  LUNG clear to auscultation, normal respiratory effort  ABDOMEN Abdomen soft, non-tender. BS normal. No masses,  No organomegaly  EXTREMITIES:negative clubbing, cyanosis, edema    NEURO Awake, alert and oriented x 3, Normal gait, no sensory deficits  SKIN No rashes, or ulcers or lesions seen  PSYCHIATRIC: Patient shows appropriate affect, is alert and oriented x3, intact judgment and insight.      Endometrial Biopsy Procedure:  Indications for endometrial biopsy are discussed with patient. Risks associated with biopsy are discussed with patient. Informed consent is signed.  Urine pregnancy test is noted to be negative  Patient is placed in dorsal lithotomy position a speculum was inserted into the vagina  The cervix was evaluated and cleansed with Betadine swabs x3  Tenaculum was applied to the anterior lip of the cervix.  Endometrial biopsy pipelle was passed through the cervical os into the endometrial cavity x 3  Uterus sounded to 8 cm  Adequate sample is obtained  Specimen sent to pathology  Instruments are removed from the vagina and cervix, hemostasis is achieved with silver nitrate  Patient tolerated the procedure well          1. Thickened endometrium  Consent for all Surgical, Special  Diagnostic or Therapeutic Procedures    POCT Pregnancy    Pathology Specimen   2. Pelvic pain     3. Dyspareunia in female         Jyotsna Franks is a 35 y.o.  female with pelvic pain/thick endometrium who presents for surgical consultation for a total laparoscopic hysterectomy with bilateral salpingectomy, possible Osei's culdoplasty, possible cystoscopy.    Specific risks include but are not limited to DVT/pulmonary embolism, pelvic scarring, pelvic pain, infection, bleeding, blood transfusion, damage to bowel, bladder or ureters, need for laparotomy and anesthesia risks.    After discussion of risks and benefits, all questions regarding procedure were answered for patient. Consents were signed.    Needs to be NPO after midnight day of surgery. Clean abdomen and umbilicus w/ antibacterial soap morning of surgery.    Postoperative course discussed with patient. Patient should return to office or emergency room for #1 fever greater than 101, #2 heavy vaginal bleeding soaking greater than one pad per hour, #3 uncontrolled pain, #4 inability to tolerate regular diet pass gas or moved bowels.    Follow up in 2 weeks after surgery for postop check.     Spent 30 mins with the patient with over 50% of the time discussing the procedure, risk and benefits and obtaining consent.

## 2021-05-04 NOTE — NON-PROVIDER
Pt here to discuss Pre op for Total  laparoscopic Hysterectomy   Dr Hanks will be doing a EMB today.   Pt states no concerns   Good # [phone]   Pharmacy verified.

## 2021-05-14 ENCOUNTER — TELEPHONE (OUTPATIENT)
Dept: OBGYN | Facility: CLINIC | Age: 36
End: 2021-05-14

## 2021-05-14 NOTE — TELEPHONE ENCOUNTER
Hannah from [facility] called requesting ICD 10 code for EMB, provided code R93.89

## 2021-05-17 ENCOUNTER — PRE-ADMISSION TESTING (OUTPATIENT)
Dept: ADMISSIONS | Facility: MEDICAL CENTER | Age: 36
End: 2021-05-17
Attending: OBSTETRICS & GYNECOLOGY
Payer: COMMERCIAL

## 2021-05-17 DIAGNOSIS — Z01.812 PRE-OPERATIVE LABORATORY EXAMINATION: ICD-10-CM

## 2021-05-17 LAB
ANION GAP SERPL CALC-SCNC: 13 MMOL/L (ref 7–16)
BUN SERPL-MCNC: 15 MG/DL (ref 8–22)
CALCIUM SERPL-MCNC: 9.5 MG/DL (ref 8.5–10.5)
CHLORIDE SERPL-SCNC: 102 MMOL/L (ref 96–112)
CO2 SERPL-SCNC: 21 MMOL/L (ref 20–33)
CREAT SERPL-MCNC: 0.59 MG/DL (ref 0.5–1.4)
ERYTHROCYTE [DISTWIDTH] IN BLOOD BY AUTOMATED COUNT: 39.8 FL (ref 35.9–50)
GLUCOSE SERPL-MCNC: 130 MG/DL (ref 65–99)
HCG SERPL QL: NEGATIVE
HCT VFR BLD AUTO: 40.7 % (ref 37–47)
HGB BLD-MCNC: 13.7 G/DL (ref 12–16)
MCH RBC QN AUTO: 29.3 PG (ref 27–33)
MCHC RBC AUTO-ENTMCNC: 33.7 G/DL (ref 33.6–35)
MCV RBC AUTO: 87.2 FL (ref 81.4–97.8)
PLATELET # BLD AUTO: 299 K/UL (ref 164–446)
PMV BLD AUTO: 10.1 FL (ref 9–12.9)
POTASSIUM SERPL-SCNC: 3.8 MMOL/L (ref 3.6–5.5)
RBC # BLD AUTO: 4.67 M/UL (ref 4.2–5.4)
SODIUM SERPL-SCNC: 136 MMOL/L (ref 135–145)
WBC # BLD AUTO: 8.6 K/UL (ref 4.8–10.8)

## 2021-05-17 PROCEDURE — 84703 CHORIONIC GONADOTROPIN ASSAY: CPT

## 2021-05-17 PROCEDURE — 85027 COMPLETE CBC AUTOMATED: CPT

## 2021-05-17 PROCEDURE — 36415 COLL VENOUS BLD VENIPUNCTURE: CPT

## 2021-05-17 PROCEDURE — 80048 BASIC METABOLIC PNL TOTAL CA: CPT

## 2021-05-17 RX ORDER — ACETAMINOPHEN 325 MG/1
650 TABLET ORAL EVERY 4 HOURS PRN
Status: ON HOLD | COMMUNITY
End: 2021-06-02

## 2021-06-01 ENCOUNTER — ANESTHESIA EVENT (OUTPATIENT)
Dept: SURGERY | Facility: MEDICAL CENTER | Age: 36
End: 2021-06-01
Payer: COMMERCIAL

## 2021-06-01 ENCOUNTER — PRE-ADMISSION TESTING (OUTPATIENT)
Dept: ADMISSIONS | Facility: MEDICAL CENTER | Age: 36
End: 2021-06-01
Attending: OBSTETRICS & GYNECOLOGY
Payer: COMMERCIAL

## 2021-06-01 DIAGNOSIS — Z01.812 PRE-OPERATIVE LABORATORY EXAMINATION: ICD-10-CM

## 2021-06-01 LAB
SARS-COV+SARS-COV-2 AG RESP QL IA.RAPID: NOTDETECTED
SPECIMEN SOURCE: NORMAL

## 2021-06-01 PROCEDURE — 87426 SARSCOV CORONAVIRUS AG IA: CPT

## 2021-06-01 NOTE — OR NURSING
COVID-19 Pre-surgery screenin. Do you have an undiagnosed respiratory illness or symptoms such as coughing or sneezing? NO (Yes/No)  a. Onset of Sx NO  b. Acute vs. chronic respiratory illness NO    2. Do you have an unexplained fever greater than 100.4 degrees Fahrenheit or 38 degrees Celsius?     NO (Yes/No)    3. Have you had direct exposure to a patient who tested positive for Covid-19?    NO (Yes/No)    4. Have you had any loss of your sense of taste or smell? Have you had N/V or sore throat? NO    Patient has been informed of visitor policy and asked to wear a mask upon entering the hospital   YES (Yes/No)

## 2021-06-02 ENCOUNTER — ANESTHESIA (OUTPATIENT)
Dept: SURGERY | Facility: MEDICAL CENTER | Age: 36
End: 2021-06-02
Payer: COMMERCIAL

## 2021-06-02 ENCOUNTER — HOSPITAL ENCOUNTER (OUTPATIENT)
Facility: MEDICAL CENTER | Age: 36
End: 2021-06-02
Attending: OBSTETRICS & GYNECOLOGY | Admitting: OBSTETRICS & GYNECOLOGY
Payer: COMMERCIAL

## 2021-06-02 VITALS
WEIGHT: 147.71 LBS | RESPIRATION RATE: 14 BRPM | HEART RATE: 73 BPM | DIASTOLIC BLOOD PRESSURE: 77 MMHG | BODY MASS INDEX: 34.18 KG/M2 | OXYGEN SATURATION: 94 % | HEIGHT: 55 IN | TEMPERATURE: 97.1 F | SYSTOLIC BLOOD PRESSURE: 119 MMHG

## 2021-06-02 DIAGNOSIS — R93.89 THICKENED ENDOMETRIUM: ICD-10-CM

## 2021-06-02 DIAGNOSIS — R10.2 PELVIC PAIN: ICD-10-CM

## 2021-06-02 DIAGNOSIS — Z41.9 SURGERY, ELECTIVE: ICD-10-CM

## 2021-06-02 LAB
HCG UR QL: NEGATIVE
PATHOLOGY CONSULT NOTE: NORMAL

## 2021-06-02 PROCEDURE — 160002 HCHG RECOVERY MINUTES (STAT): Performed by: OBSTETRICS & GYNECOLOGY

## 2021-06-02 PROCEDURE — A9270 NON-COVERED ITEM OR SERVICE: HCPCS | Performed by: OBSTETRICS & GYNECOLOGY

## 2021-06-02 PROCEDURE — 501664 HCHG TUBING, FILTER STRYKER: Performed by: OBSTETRICS & GYNECOLOGY

## 2021-06-02 PROCEDURE — C2628 CATHETER, OCCLUSION: HCPCS | Performed by: OBSTETRICS & GYNECOLOGY

## 2021-06-02 PROCEDURE — 700105 HCHG RX REV CODE 258: Performed by: OBSTETRICS & GYNECOLOGY

## 2021-06-02 PROCEDURE — 500886 HCHG PACK, LAPAROSCOPY: Performed by: OBSTETRICS & GYNECOLOGY

## 2021-06-02 PROCEDURE — 501838 HCHG SUTURE GENERAL: Performed by: OBSTETRICS & GYNECOLOGY

## 2021-06-02 PROCEDURE — 160046 HCHG PACU - 1ST 60 MINS PHASE II: Performed by: OBSTETRICS & GYNECOLOGY

## 2021-06-02 PROCEDURE — 160048 HCHG OR STATISTICAL LEVEL 1-5: Performed by: OBSTETRICS & GYNECOLOGY

## 2021-06-02 PROCEDURE — 58571 TLH W/T/O 250 G OR LESS: CPT | Performed by: OBSTETRICS & GYNECOLOGY

## 2021-06-02 PROCEDURE — 700111 HCHG RX REV CODE 636 W/ 250 OVERRIDE (IP): Performed by: ANESTHESIOLOGY

## 2021-06-02 PROCEDURE — 700104 HCHG RX REV CODE 254: Performed by: ANESTHESIOLOGY

## 2021-06-02 PROCEDURE — 500002 HCHG ADHESIVE, DERMABOND: Performed by: OBSTETRICS & GYNECOLOGY

## 2021-06-02 PROCEDURE — 500868 HCHG NEEDLE, SURGI(VARES): Performed by: OBSTETRICS & GYNECOLOGY

## 2021-06-02 PROCEDURE — 502587 HCHG PACK, D&C: Performed by: OBSTETRICS & GYNECOLOGY

## 2021-06-02 PROCEDURE — A9270 NON-COVERED ITEM OR SERVICE: HCPCS | Performed by: ANESTHESIOLOGY

## 2021-06-02 PROCEDURE — 700102 HCHG RX REV CODE 250 W/ 637 OVERRIDE(OP): Performed by: ANESTHESIOLOGY

## 2021-06-02 PROCEDURE — 700101 HCHG RX REV CODE 250: Performed by: ANESTHESIOLOGY

## 2021-06-02 PROCEDURE — 88307 TISSUE EXAM BY PATHOLOGIST: CPT

## 2021-06-02 PROCEDURE — 501330 HCHG SET, CYSTO IRRIG TUBING: Performed by: OBSTETRICS & GYNECOLOGY

## 2021-06-02 PROCEDURE — 700101 HCHG RX REV CODE 250

## 2021-06-02 PROCEDURE — 81025 URINE PREGNANCY TEST: CPT

## 2021-06-02 PROCEDURE — 160009 HCHG ANES TIME/MIN: Performed by: OBSTETRICS & GYNECOLOGY

## 2021-06-02 PROCEDURE — 160041 HCHG SURGERY MINUTES - EA ADDL 1 MIN LEVEL 4: Performed by: OBSTETRICS & GYNECOLOGY

## 2021-06-02 PROCEDURE — 160025 RECOVERY II MINUTES (STATS): Performed by: OBSTETRICS & GYNECOLOGY

## 2021-06-02 PROCEDURE — 502240 HCHG MISC OR SUPPLY RC 0272: Performed by: OBSTETRICS & GYNECOLOGY

## 2021-06-02 PROCEDURE — 501411 HCHG SPONGE, BABY LAP W/O RINGS: Performed by: OBSTETRICS & GYNECOLOGY

## 2021-06-02 PROCEDURE — 57283 COLPOPEXY INTRAPERITONEAL: CPT | Performed by: OBSTETRICS & GYNECOLOGY

## 2021-06-02 PROCEDURE — 502648 HCHG APPLICATOR, EVICEL: Performed by: OBSTETRICS & GYNECOLOGY

## 2021-06-02 PROCEDURE — 501581 HCHG TROCAR: Performed by: OBSTETRICS & GYNECOLOGY

## 2021-06-02 PROCEDURE — 160035 HCHG PACU - 1ST 60 MINS PHASE I: Performed by: OBSTETRICS & GYNECOLOGY

## 2021-06-02 PROCEDURE — 700101 HCHG RX REV CODE 250: Performed by: OBSTETRICS & GYNECOLOGY

## 2021-06-02 PROCEDURE — 500892 HCHG PACK, PERI-GYN: Performed by: OBSTETRICS & GYNECOLOGY

## 2021-06-02 PROCEDURE — 160029 HCHG SURGERY MINUTES - 1ST 30 MINS LEVEL 4: Performed by: OBSTETRICS & GYNECOLOGY

## 2021-06-02 PROCEDURE — 57283 COLPOPEXY INTRAPERITONEAL: CPT | Mod: 80 | Performed by: OBSTETRICS & GYNECOLOGY

## 2021-06-02 PROCEDURE — 110454 HCHG SHELL REV 250: Performed by: OBSTETRICS & GYNECOLOGY

## 2021-06-02 PROCEDURE — 58571 TLH W/T/O 250 G OR LESS: CPT | Mod: 80 | Performed by: OBSTETRICS & GYNECOLOGY

## 2021-06-02 PROCEDURE — 160036 HCHG PACU - EA ADDL 30 MINS PHASE I: Performed by: OBSTETRICS & GYNECOLOGY

## 2021-06-02 RX ORDER — OXYCODONE HCL 5 MG/5 ML
10 SOLUTION, ORAL ORAL
Status: COMPLETED | OUTPATIENT
Start: 2021-06-02 | End: 2021-06-02

## 2021-06-02 RX ORDER — ACETAMINOPHEN 500 MG
1000 TABLET ORAL ONCE
Status: COMPLETED | OUTPATIENT
Start: 2021-06-02 | End: 2021-06-02

## 2021-06-02 RX ORDER — ONDANSETRON 2 MG/ML
4 INJECTION INTRAMUSCULAR; INTRAVENOUS
Status: COMPLETED | OUTPATIENT
Start: 2021-06-02 | End: 2021-06-02

## 2021-06-02 RX ORDER — HALOPERIDOL 5 MG/ML
1 INJECTION INTRAMUSCULAR
Status: DISCONTINUED | OUTPATIENT
Start: 2021-06-02 | End: 2021-06-02 | Stop reason: HOSPADM

## 2021-06-02 RX ORDER — LIDOCAINE HYDROCHLORIDE 10 MG/ML
INJECTION, SOLUTION EPIDURAL; INFILTRATION; INTRACAUDAL; PERINEURAL
Status: DISCONTINUED
Start: 2021-06-02 | End: 2021-06-02 | Stop reason: HOSPADM

## 2021-06-02 RX ORDER — OXYCODONE HCL 5 MG/5 ML
5 SOLUTION, ORAL ORAL
Status: COMPLETED | OUTPATIENT
Start: 2021-06-02 | End: 2021-06-02

## 2021-06-02 RX ORDER — HYDROMORPHONE HYDROCHLORIDE 1 MG/ML
0.2 INJECTION, SOLUTION INTRAMUSCULAR; INTRAVENOUS; SUBCUTANEOUS
Status: DISCONTINUED | OUTPATIENT
Start: 2021-06-02 | End: 2021-06-02 | Stop reason: HOSPADM

## 2021-06-02 RX ORDER — OXYCODONE HYDROCHLORIDE AND ACETAMINOPHEN 5; 325 MG/1; MG/1
1 TABLET ORAL EVERY 4 HOURS PRN
Qty: 30 TABLET | Refills: 0 | Status: SHIPPED | OUTPATIENT
Start: 2021-06-02 | End: 2021-06-09

## 2021-06-02 RX ORDER — SODIUM CHLORIDE, SODIUM LACTATE, POTASSIUM CHLORIDE, CALCIUM CHLORIDE 600; 310; 30; 20 MG/100ML; MG/100ML; MG/100ML; MG/100ML
INJECTION, SOLUTION INTRAVENOUS CONTINUOUS
Status: ACTIVE | OUTPATIENT
Start: 2021-06-02 | End: 2021-06-02

## 2021-06-02 RX ORDER — CEFAZOLIN SODIUM 1 G/3ML
INJECTION, POWDER, FOR SOLUTION INTRAMUSCULAR; INTRAVENOUS PRN
Status: DISCONTINUED | OUTPATIENT
Start: 2021-06-02 | End: 2021-06-02 | Stop reason: SURG

## 2021-06-02 RX ORDER — DEXTROSE MONOHYDRATE 25 G/50ML
50 INJECTION, SOLUTION INTRAVENOUS
Status: DISCONTINUED | OUTPATIENT
Start: 2021-06-02 | End: 2021-06-02 | Stop reason: HOSPADM

## 2021-06-02 RX ORDER — DEXAMETHASONE SODIUM PHOSPHATE 4 MG/ML
INJECTION, SOLUTION INTRA-ARTICULAR; INTRALESIONAL; INTRAMUSCULAR; INTRAVENOUS; SOFT TISSUE PRN
Status: DISCONTINUED | OUTPATIENT
Start: 2021-06-02 | End: 2021-06-02 | Stop reason: SURG

## 2021-06-02 RX ORDER — MEPERIDINE HYDROCHLORIDE 25 MG/ML
12.5 INJECTION INTRAMUSCULAR; INTRAVENOUS; SUBCUTANEOUS
Status: DISCONTINUED | OUTPATIENT
Start: 2021-06-02 | End: 2021-06-02 | Stop reason: HOSPADM

## 2021-06-02 RX ORDER — MIDAZOLAM HYDROCHLORIDE 1 MG/ML
1 INJECTION INTRAMUSCULAR; INTRAVENOUS
Status: DISCONTINUED | OUTPATIENT
Start: 2021-06-02 | End: 2021-06-02 | Stop reason: HOSPADM

## 2021-06-02 RX ORDER — HYDROMORPHONE HYDROCHLORIDE 1 MG/ML
0.4 INJECTION, SOLUTION INTRAMUSCULAR; INTRAVENOUS; SUBCUTANEOUS
Status: DISCONTINUED | OUTPATIENT
Start: 2021-06-02 | End: 2021-06-02 | Stop reason: HOSPADM

## 2021-06-02 RX ORDER — SODIUM CHLORIDE, SODIUM LACTATE, POTASSIUM CHLORIDE, CALCIUM CHLORIDE 600; 310; 30; 20 MG/100ML; MG/100ML; MG/100ML; MG/100ML
INJECTION, SOLUTION INTRAVENOUS CONTINUOUS
Status: DISCONTINUED | OUTPATIENT
Start: 2021-06-02 | End: 2021-06-02 | Stop reason: HOSPADM

## 2021-06-02 RX ORDER — SUCCINYLCHOLINE/SOD CL,ISO/PF 200MG/10ML
SYRINGE (ML) INTRAVENOUS PRN
Status: DISCONTINUED | OUTPATIENT
Start: 2021-06-02 | End: 2021-06-02 | Stop reason: SURG

## 2021-06-02 RX ORDER — EPINEPHRINE 1 MG/ML(1)
AMPUL (ML) INJECTION
Status: DISCONTINUED
Start: 2021-06-02 | End: 2021-06-02 | Stop reason: HOSPADM

## 2021-06-02 RX ORDER — DIPHENHYDRAMINE HYDROCHLORIDE 50 MG/ML
12.5 INJECTION INTRAMUSCULAR; INTRAVENOUS
Status: DISCONTINUED | OUTPATIENT
Start: 2021-06-02 | End: 2021-06-02 | Stop reason: HOSPADM

## 2021-06-02 RX ORDER — HYDROMORPHONE HYDROCHLORIDE 1 MG/ML
0.1 INJECTION, SOLUTION INTRAMUSCULAR; INTRAVENOUS; SUBCUTANEOUS
Status: DISCONTINUED | OUTPATIENT
Start: 2021-06-02 | End: 2021-06-02 | Stop reason: HOSPADM

## 2021-06-02 RX ORDER — LABETALOL HYDROCHLORIDE 5 MG/ML
5 INJECTION, SOLUTION INTRAVENOUS
Status: DISCONTINUED | OUTPATIENT
Start: 2021-06-02 | End: 2021-06-02 | Stop reason: HOSPADM

## 2021-06-02 RX ORDER — BUPIVACAINE HYDROCHLORIDE 2.5 MG/ML
INJECTION, SOLUTION EPIDURAL; INFILTRATION; INTRACAUDAL
Status: DISCONTINUED
Start: 2021-06-02 | End: 2021-06-02 | Stop reason: HOSPADM

## 2021-06-02 RX ORDER — ROCURONIUM BROMIDE 10 MG/ML
INJECTION, SOLUTION INTRAVENOUS PRN
Status: DISCONTINUED | OUTPATIENT
Start: 2021-06-02 | End: 2021-06-02 | Stop reason: SURG

## 2021-06-02 RX ORDER — ONDANSETRON 2 MG/ML
INJECTION INTRAMUSCULAR; INTRAVENOUS PRN
Status: DISCONTINUED | OUTPATIENT
Start: 2021-06-02 | End: 2021-06-02 | Stop reason: SURG

## 2021-06-02 RX ORDER — HYDRALAZINE HYDROCHLORIDE 20 MG/ML
5 INJECTION INTRAMUSCULAR; INTRAVENOUS
Status: DISCONTINUED | OUTPATIENT
Start: 2021-06-02 | End: 2021-06-02 | Stop reason: HOSPADM

## 2021-06-02 RX ADMIN — FENTANYL CITRATE 100 MCG: 50 INJECTION, SOLUTION INTRAMUSCULAR; INTRAVENOUS at 08:38

## 2021-06-02 RX ADMIN — ONDANSETRON 4 MG: 2 INJECTION INTRAMUSCULAR; INTRAVENOUS at 11:24

## 2021-06-02 RX ADMIN — ROCURONIUM BROMIDE 10 MG: 10 INJECTION, SOLUTION INTRAVENOUS at 07:49

## 2021-06-02 RX ADMIN — Medication 100 MG: at 07:45

## 2021-06-02 RX ADMIN — SODIUM CHLORIDE, POTASSIUM CHLORIDE, SODIUM LACTATE AND CALCIUM CHLORIDE: 600; 310; 30; 20 INJECTION, SOLUTION INTRAVENOUS at 07:01

## 2021-06-02 RX ADMIN — LIDOCAINE HYDROCHLORIDE 0.1 ML: 10 INJECTION, SOLUTION INFILTRATION; PERINEURAL at 07:05

## 2021-06-02 RX ADMIN — PROPOFOL 200 MG: 10 INJECTION, EMULSION INTRAVENOUS at 07:44

## 2021-06-02 RX ADMIN — OXYCODONE HYDROCHLORIDE 10 MG: 5 SOLUTION ORAL at 10:43

## 2021-06-02 RX ADMIN — INDIGO CARMINE 40 MG: 8 INJECTION, SOLUTION INTRAMUSCULAR; INTRAVENOUS at 09:12

## 2021-06-02 RX ADMIN — ROCURONIUM BROMIDE 5 MG: 10 INJECTION, SOLUTION INTRAVENOUS at 08:02

## 2021-06-02 RX ADMIN — POVIDONE IODINE 15 ML: 100 SOLUTION TOPICAL at 07:01

## 2021-06-02 RX ADMIN — FENTANYL CITRATE 150 MCG: 50 INJECTION, SOLUTION INTRAMUSCULAR; INTRAVENOUS at 08:13

## 2021-06-02 RX ADMIN — DEXAMETHASONE SODIUM PHOSPHATE 4 MG: 4 INJECTION, SOLUTION INTRA-ARTICULAR; INTRALESIONAL; INTRAMUSCULAR; INTRAVENOUS; SOFT TISSUE at 07:50

## 2021-06-02 RX ADMIN — CEFAZOLIN 2 G: 330 INJECTION, POWDER, FOR SOLUTION INTRAMUSCULAR; INTRAVENOUS at 07:46

## 2021-06-02 RX ADMIN — ONDANSETRON 4 MG: 2 INJECTION INTRAMUSCULAR; INTRAVENOUS at 07:50

## 2021-06-02 RX ADMIN — ROCURONIUM BROMIDE 5 MG: 10 INJECTION, SOLUTION INTRAVENOUS at 09:11

## 2021-06-02 RX ADMIN — ROCURONIUM BROMIDE 10 MG: 10 INJECTION, SOLUTION INTRAVENOUS at 08:27

## 2021-06-02 RX ADMIN — SODIUM CHLORIDE, POTASSIUM CHLORIDE, SODIUM LACTATE AND CALCIUM CHLORIDE: 600; 310; 30; 20 INJECTION, SOLUTION INTRAVENOUS at 09:10

## 2021-06-02 RX ADMIN — FENTANYL CITRATE 150 MCG: 50 INJECTION, SOLUTION INTRAMUSCULAR; INTRAVENOUS at 09:11

## 2021-06-02 RX ADMIN — ACETAMINOPHEN 1000 MG: 500 TABLET ORAL at 07:01

## 2021-06-02 RX ADMIN — ROCURONIUM BROMIDE 5 MG: 10 INJECTION, SOLUTION INTRAVENOUS at 08:50

## 2021-06-02 RX ADMIN — SUGAMMADEX 200 MG: 100 INJECTION, SOLUTION INTRAVENOUS at 09:39

## 2021-06-02 RX ADMIN — FENTANYL CITRATE 100 MCG: 50 INJECTION, SOLUTION INTRAMUSCULAR; INTRAVENOUS at 07:44

## 2021-06-02 ASSESSMENT — PAIN DESCRIPTION - PAIN TYPE
TYPE: SURGICAL PAIN

## 2021-06-02 ASSESSMENT — PAIN SCALES - GENERAL: PAIN_LEVEL: 0

## 2021-06-02 NOTE — ANESTHESIA TIME REPORT
Anesthesia Start and Stop Event Times     Date Time Event    6/2/2021 0712 Ready for Procedure     0741 Anesthesia Start     0949 Anesthesia Stop        Responsible Staff  06/02/21    Name Role Begin End    August W COMPA Pham. Anesth 0741 [id number]        Preop Diagnosis (Free Text):  Pre-op Diagnosis     THICKENED ENDOMETRIUM, PELVIC PAIN        Preop Diagnosis (Codes):    Post op Diagnosis  Pelvic pain      Premium Reason  Non-Premium    Comments:

## 2021-06-02 NOTE — OR SURGEON
Immediate Post OP Note    PreOp Diagnosis: Thickened endometrium, pelvic pain      PostOp Diagnosis: Same      Procedure(s):  HYSTERECTOMY, LAPAROSCOPIC-TOTAL - Wound Class: Clean  SALPINGECTOMY - Wound Class: Clean  MCCALLS CULDOPLASTY - Wound Class: Clean  CYSTOSCOPY - Wound Class: Clean Contaminated    Surgeon(s):  LISBET Louis M.D.    Anesthesiologist/Type of Anesthesia:  Anesthesiologist: Jose Antonio Pham M.D./General    Surgical Staff:  Circulator: Demetra Mohan R.N.  Relief Circulator: Sandra Barrios R.N.  Scrub Person: Maira King    Specimens removed if any:  ID Type Source Tests Collected by Time Destination   A : Uterus, Cervix, Bilateral Fallopian Tubes Tissue Uterus PATHOLOGY SPECIMEN Juan F Hanks M.D. 6/2/2021  8:39 AM        Estimated Blood Loss: 250 mL    Findings: Normal-appearing uterus, tubes and ovaries.  No intra-abdominal pathology noted     Complications: None        6/2/2021 9:53 AM Juan F Hanks M.D.

## 2021-06-02 NOTE — ANESTHESIA PREPROCEDURE EVALUATION
Relevant Problems   NEURO   (positive) History of gestational diabetes       Physical Exam    Airway   Mallampati: III  TM distance: <3 FB  Neck ROM: full       Cardiovascular - normal exam  Rhythm: regular  Rate: normal  (-) murmur     Dental - normal exam           Pulmonary - normal exam  Breath sounds clear to auscultation     Abdominal   (+) obese     Neurological - normal exam                 Anesthesia Plan    ASA 2       Plan - general       Airway plan will be ETT          Induction: intravenous    Postoperative Plan: Postoperative administration of opioids is intended.    Pertinent diagnostic labs and testing reviewed    Informed Consent:    Anesthetic plan and risks discussed with patient.    Use of blood products discussed with: patient whom consented to blood products.

## 2021-06-02 NOTE — ANESTHESIA TIME REPORT
Anesthesia Start and Stop Event Times     Date Time Event    6/2/2021 0712 Ready for Procedure        Responsible Staff    No responsible staff documented.       Preop Diagnosis (Free Text):  Pre-op Diagnosis     THICKENED ENDOMETRIUM, PELVIC PAIN        Preop Diagnosis (Codes):    Post op Diagnosis  Pelvic pain      Premium Reason  Non-Premium    Comments:

## 2021-06-02 NOTE — OR NURSING
0635 Pt speaks English well. Feels comfortable signing English consent, no need for  at this time.

## 2021-06-02 NOTE — ANESTHESIA POSTPROCEDURE EVALUATION
Patient: Jyotsna Franks    Procedure Summary     Date: 06/02/21 Room / Location: [facility] ROOM 25 / SURGERY SAME DAY [facility]    Anesthesia Start: 0741 Anesthesia Stop:     Procedures:       HYSTERECTOMY, LAPAROSCOPIC-TOTAL (Abdomen)      SALPINGECTOMY (Bilateral Fallopian Tube)      MCCALLS CULDOPLASTY (N/A Vagina )      CYSTOSCOPY (N/A Urethra) Diagnosis: (THICKENED ENDOMETRIUM, PELVIC PAIN)    Surgeons: Juan F Hanks M.D. Responsible Provider: Jose Antonio Pham M.D.    Anesthesia Type: general ASA Status: 2          Final Anesthesia Type: general  Last vitals  BP   Blood Pressure: 120/79    Temp   36.2 °C (97.1 °F)    Pulse   75   Resp   18    SpO2   98 %      Anesthesia Post Evaluation    Patient location during evaluation: PACU  Patient participation: complete - patient participated  Level of consciousness: awake  Pain score: 0    Airway patency: patent  Anesthetic complications: no  Cardiovascular status: hemodynamically stable  Respiratory status: acceptable and face mask  Hydration status: euvolemic    PONV: none          No complications documented.

## 2021-06-02 NOTE — ANESTHESIA PROCEDURE NOTES
Airway    Date/Time: 6/2/2021 7:45 AM  Performed by: Jose Antonio Pham M.D.  Authorized by: Jose Antonio Pham M.D.     Location:  OR  Urgency:  Elective  Difficult Airway: No    Indications for Airway Management:  Anesthesia      Spontaneous Ventilation: absent    Sedation Level:  Deep  Preoxygenated: Yes    Patient Position:  Sniffing  Mask Difficulty Assessment:  1 - vent by mask  Final Airway Type:  Endotracheal airway  Final Endotracheal Airway:  ETT  Cuffed: Yes    Technique Used for Successful ETT Placement:  Direct laryngoscopy  Devices/Methods Used in Placement:  Intubating stylet    Insertion Site:  Oral  Blade Type:  Loyola  Laryngoscope Blade/Videolaryngoscope Blade Size:  2  ETT Size (mm):  6.5  Measured from:  Teeth  ETT to Teeth (cm):  20  Placement Verified by: auscultation and capnometry    Cormack-Lehane Classification:  Grade I - full view of glottis  Number of Attempts at Approach:  1

## 2021-06-02 NOTE — OR NURSING
"1203 -- report from GLENROY Reza. Pt arrived to PACU 2 bay \"B\". Stand and transfer to recliner chair. Connected to monitor. PIV saline locked. Pt dressed and able to void (per report, approx 100mL).    1226 -- discharge instructions given to pt and SO. Questions answered, both stated understanding.    1243 -- PIV removed with tip intact.     1253 - Dr. Hanks at bedside    1256 - Pt discharged home in stable condition. Down to pick-up area via wheelchair accompanied by ENRIQUE Kahn. All belongings taken.     "

## 2021-06-02 NOTE — DISCHARGE INSTRUCTIONS
Instrucciones Para [place]  (Home Care Instructions)    ACTIVIDAD: Descanse y tome todo con mucha calma las primeras 24 horas después de cummings cirugía.  Jacinta persona adulta responsable debe permanecer con usted madie luzma periodo de tiempo.  Es normal sentirse sonoliento o sonolienta madie esas primeras horas.  Le recomendamos que no bill nada que requiera equilibrio, vince decisiones a mucha coordinación de cummings parte.    NO BILL ESTO PURANTE LAS PRIMERAS 24 HORAS:   Manejar o conducir algún vehiculo, operar maquinarias o utilizar electrodomesticos.   Beber cerveza o algún otro tipo de bebida alcohólica.   Vince decisiones importantes o firmar documentos legales.    DIETA: Para evitar las nauseas, prosiga despacito con cummings dieta a medida que pueda ir tolerándola mejor, evite comidas muy condimentadas o grasosas madie luzma primer día.  Vaya agregando comidas más substanciadas a cummings dieta a medida que asi lo indique cummings médica.  SIGA AGREGANDO LIQUIDOS Y COMIDAS CON FIBRA PARA EVITAR ESTREÑIMIENTO.    WES BAÑARSE Y CAMBIAR LOS VENDAJES DE LA CIRUGIA:      MEDICAMENTOS/MEDICINAS:  Vuelva a vince jj medicamentos diarios.  [place] los medicamentos que se le prescribe con un poco de comida.  Si no le prescribe ningún tipo de medicamento, entonces puede vince medicinas para el dolor que no contienen aspirina, si las necesita.  LAS MEDICINAS PARA EL DOLOR PUEDEN ESTREÑIRLE MUCHO.  [place] un suavizante para el excremento o materia fecal (stool softener) o un laxativo wes por ejemplo: senokot, pericolase, o leche de magnesia, si lo necesita.    La prescripción la administro ***.  La ultima sosis de medicina para el dolor fue administrada ***.     Se debe hacer jacinta consulta medica con el doctor en DOS SEMANAS, Líame para hacer la abram.    Usted debe LIAMAR A CUMMINGS MEDICO si tiene los siguientes síntomas:   -   Jacinta fiebre más mingo de 101 grados Fahrenheit.   -   Un dolor incesante aún con los medicamentos, o nauseas y vómito  persistente.   -   Un sangrado excesivo (cristian que traspasa los vendajes o gasas) o algúln tipo de drenaje inesperado que proviene de la henda.     -   Un color chacko exagerado o hinchazón alrededor del área en donde se le hizo incisión o guero, o un drenaje de pus o con olor reinaldo proveniente de la henda.   -    La inhabilidad de orinar o vaciar cummings vejiga en 8 horas.   -    Problemas con a respiración o bob en el pecho.    Usted debe llamar al 911 si se presentan problemas con el dolor al respirar o el pecho.  Si no se puede ponnoer en comunicación con un medica o con el centro de cirugía, usted debe ir a la estación de emergencia (emergency room) más cercana o a un centro de atención de urgencia (urgent care center).  El teléfono del medico es: [phone]    LOS SÍNTOMAS DE UN LEVE RESFRIO SON MUY NORMALES.  ADEMÁS USTED PUEDE LLEGAR A SENTIR BOB GENERALES DE MÚSCULOS, IRRITACIÓN EN LA GARGANTA, BOB DE GELY Y/O UN POCO DE NAUSEAS.    Sie tiene alguna pregunta, llame a cummings médico.  Si cummings médico no se encuentra disponible, por favor llame al Centro de Cirugía at [phone].  el Centro está abierto de Lunes a Viernes desde las 7:00 de la manana hasta las 5:00 de la noche.  Usted también puede llamar al CENTRO [facility] LLAMAD[facility] SOBRE LA MAGGIE o HEALTH HOTLINE.  Margaret está abierto viente y cuatro horas por minnie, siete tian por semana, allí podrá hablar con jacinta enfermera.  Llame al [phone], o al número [place] 3 [phone].    Mi firma a continuación indica que he recibido y entiendco estas instrucciones acera de los cuidados en la casa (Home Care Instructions)    · Yakelin recibirá jacinta encuesta en la correspondencia en las siguientes semanas y le pedimos que por favor tome un momento para completar stef encuesta y regresaría a hosotros.  Nuestro objetivó es brindarle un cuidado muy weber y par lo tanto apreciamos jj coméntanos.  Muchas alaina por snow escogido el Centro de Cirugía de [facility].

## 2021-06-02 NOTE — OP REPORT
DATE OF OPERATION: June 2, 2021    PREOPERATIVE DIAGNOSES:   Thick endometrium, pelvic pain    POSTOPERATIVE DIAGNOSES:   Same     SURGEON: Juan F Hanks MD     ASSISTANT: Anabel Iverson DO     PROCEDURES PERFORMED:   Total Laparoscopic Hysterectomy, bilateral salpingectomy, Osei's culdoplasty, cystoscopy    ANESTHESIA: General endotracheal anesthesia.     ANESTHESIOLOGIST: KACI Pham MD     ESTIMATED BLOOD LOSS: 250 mL     URINE OUTPUT: 200 mL of clear urine at the end of procedure.     FINDINGS: normal cervix, uterus sounded to 10 cm, normal uterus, normal ovaries bilaterally, bilateral tubes with evidence of prior tubal ligation but otherwise normal, normal appendix, normal liver edge and gallbladder    COMPLICATIONS: none     PROCEDURE IN DETAIL: After informed consent was obtained, the patient was taken to the operating room where general endotracheal anesthesia was administered without difficulty. She was then prepped and draped in the usual sterile fashion in the lithotomy position with Teofilo stirrups. A formal time out was called prior to the procedure and the preoperative antibiotics were given. Examination under anesthesia was performed and a Tam catheter was placed under sterile technique.  Ansley uterine manipulator was placed without any difficulty and the uterus was sounded to 10 cm. The procedure went   towards the abdomen at this time.  Lidocaine with epinephrine was Infiltrated into the umbilicus and a 5mm incision was made in the umbilicus with a scalpel.  A Veress needle was introduced into the abdomen, saline drop test was positive.  A 5mm trochar was placed over the laparoscope and the scope/trochar were advanced into the abdominal cavity under direct laparoscopic visualization.  The abdomen was insufflated with CO2 gas at a pressure of 15 mmHg and pneumoperitoneum was maintained at approximately 3 liters of CO2 gas. Intraabdominal placement was confirmed and also to note that there was no  injury to the underlying tissue. A survey of the pelvis and abdomen was then performed as noted above. The ureters were identified and noted to have peristalsis.  Their location was noted during the entire case.  Lidocaine with epinephrine was then injected into left lower quadrant approximately 2 cm medial to the anterior ischial spine and a 5 mm skin incision was made with a knife. The 5 mm trocar was then advanced under direct visualization with no injury to the underlying tissue. A 5mm trochar was placed in the right lower quadrant in the exact same fashion. The procedure was then started. Starting with the left side, the infundibulopelvic ligament was identified.  The fallopian tube was elevated and transected away from the ovary in the broad ligament to the level of the prior tubal ligation. The tube was removed from the port and passed for pathology specimen. The utero-ovarian pedicle was transected with the Ligasure. Hemostasis was noted. The ovaries were left in situ. The round ligament on the left was then grasped, cauterized and cut with the Ligasure. The anterior and posterior leaf of the broad ligament were then incised along the bladder reflection to the midline and the uterine arteries were then skeletonized, grasped with bipolar cautery, cauterized and transected with the Ligasure. Again, hemostasis was noted.     The procedure at this point went towards the patient's right. The right infundibulopelvic ligament was identified. The fallopian tube was elevated and transected away from the ovary in the broad ligament to the level of the prior tubal ligation. The tube was removed from the port and passed for pathology specimen. The utero-ovarian pedicle was transected with the Ligasure. Hemostasis was noted. The round ligament was then grasped, cauterized and cut with the Ligasure. The anterior and posterior leaf of the broad ligament were then incised along the bladder reflection to the midline and the  uterine arteries were then skeletonized and grasped with bipolar cautery, cauterized and transected with the Ligasure. Again, hemostasis was noted. The bladder was then gently dissected off the lower uterine segment and the cervix. The colpotomy was made in a circumferential fashion around the Ansley ring using the J hook and the entire specimen was removed vaginally.     The vagina was then closed in the vaginal manner.  Both angles of the cuff were first tied with 0 Vicryl suture.  A Osei's culdoplasty was then placed at this time with 0 PDS suture.  The suture was passed through the posterior aspect of the vaginal cuff, through the left uterosacral ligament, across the posterior peritoneum, then through the right uterosacral ligament and then passed through the posterior cuff once again.    Gregory carmine was then given intravenously and a cystoscopy performed.  Bilateral ureteral jets were noted.  The Osei's culdoplasty was then tied and cystoscopy once again performed.  Bilateral ureteral jets were once again visualized.    The abdomen was once again insufflated, evaluation of surgical sites did show small bleeding from the left pedicle.  This was cauterized and Surgi-Austen added to the area to ensure excellent hemostasis.    All instruments were removed under direct visualization as was the CO2 gas.  The skin at all ports were reapproximated with 4-0 monocryl subcuticular fashion. Dermabond glue was placed over the incisions. The matthews was removed from the bladder and the glove/sponge was removed from the vagina. The procedure was complete.    Sponge, needle, instrument, and lap counts were correct x2. Patient tolerated the procedure well and went to recovery room in stable condition.       ____________________________________   Juan F Hanks MD

## 2021-06-16 ENCOUNTER — GYNECOLOGY VISIT (OUTPATIENT)
Dept: OBGYN | Facility: CLINIC | Age: 36
End: 2021-06-16
Payer: COMMERCIAL

## 2021-06-16 VITALS — BODY MASS INDEX: 35.44 KG/M2 | DIASTOLIC BLOOD PRESSURE: 74 MMHG | WEIGHT: 147 LBS | SYSTOLIC BLOOD PRESSURE: 124 MMHG

## 2021-06-16 DIAGNOSIS — Z48.89 POSTOPERATIVE VISIT: ICD-10-CM

## 2021-06-16 PROCEDURE — 99024 POSTOP FOLLOW-UP VISIT: CPT | Performed by: OBSTETRICS & GYNECOLOGY

## 2021-06-16 RX ORDER — NYSTATIN 100000 U/G
OINTMENT TOPICAL
Qty: 30 G | Refills: 1 | Status: SHIPPED | OUTPATIENT
Start: 2021-06-16 | End: 2022-03-25

## 2021-06-16 NOTE — PROGRESS NOTES
Chief complaint: Postoperative visit    SUBJECTIVE:  Jyotsna Franks presents to the clinic 2 weeks following total laparoscopic hysterectomy with bilateral salpingectomy, culdoplasty    Patient reports a rash in her labial area.  Positive itching    Eating a regular diet without difficulty.   Bowel movement are Normal.    The patient is not having any pain. .   Patient Denies Incisional pain, drainage or redness    OBJECTIVE:  /74 (BP Location: Right arm, Patient Position: Sitting)   Wt 66.7 kg (147 lb)   BMI 35.44 kg/m²   Current Outpatient Medications on File Prior to Visit   Medication Sig Dispense Refill   • MethylPREDNISolone (MEDROL DOSEPAK) 4 MG Tablet Therapy Pack Use as directed (Patient not taking: Reported on 2/27/2020) 1 Kit 0   • loratadine (CLARITIN REDITABS) 10 MG dissolvable tablet Take 1 Tab by mouth every day. (Patient not taking: Reported on 5/17/2021) 30 Tab 0   • omega-3 acid ethyl esters (LOVAZA) 1 GM capsule Take 1 Cap by mouth 4 times a day. (Patient not taking: Reported on 5/14/2019) 120 Cap 1   • omeprazole (PRILOSEC) 20 MG delayed-release capsule Take 1 Cap by mouth every morning before breakfast. (Patient not taking: Reported on 5/14/2019) 30 Cap 5     No current facility-administered medications on file prior to visit.       Constitutional:  alert, healthy, no distress.  Abdomen:  soft, bowel sounds active, non-tender, non-distended.  Incision:  healing well, no drainage, no erythema, no hernia, no seroma, no swelling, no dehiscence, incision well approximated.  Pelvic: Erythema is noted on bilateral labia, suspicious for yeast vaginitis.    IMPRESSION: Postoperative course complicated by yeast vaginitis      Lab:   Recent Results (from the past 1008 hour(s))   HCG,QUAL (OUTPATIENT ONLY)    Collection Time: 05/17/21  4:29 PM   Result Value Ref Range    Beta-Hcg Qualitative Serum Negative Negative   Basic Metabolic Panel    Collection Time: 05/17/21  4:29 PM   Result  Value Ref Range    Sodium 136 135 - 145 mmol/L    Potassium 3.8 3.6 - 5.5 mmol/L    Chloride 102 96 - 112 mmol/L    Co2 21 20 - 33 mmol/L    Glucose 130 (H) 65 - 99 mg/dL    Bun 15 8 - 22 mg/dL    Creatinine 0.59 0.50 - 1.40 mg/dL    Calcium 9.5 8.5 - 10.5 mg/dL    Anion Gap 13.0 7.0 - 16.0   CBC Without Differential    Collection Time: 05/17/21  4:29 PM   Result Value Ref Range    WBC 8.6 4.8 - 10.8 K/uL    RBC 4.67 4.20 - 5.40 M/uL    Hemoglobin 13.7 12.0 - 16.0 g/dL    Hematocrit 40.7 37.0 - 47.0 %    MCV 87.2 81.4 - 97.8 fL    MCH 29.3 27.0 - 33.0 pg    MCHC 33.7 33.6 - 35.0 g/dL    RDW 39.8 35.9 - 50.0 fL    Platelet Count 299 164 - 446 K/uL    MPV 10.1 9.0 - 12.9 fL   ESTIMATED GFR    Collection Time: 05/17/21  4:29 PM   Result Value Ref Range    GFR If African American >60 >60 mL/min/1.73 m 2    GFR If Non African American >60 >60 mL/min/1.73 m 2   SARS-COV RAPID Antigen JAMISON: Collect dry nasal swab    Collection Time: 06/01/21 12:50 PM   Result Value Ref Range    SARS-CoV-2 Source Nasal Swab     SARS-COV ANTIGEN JAMISON NotDetected Not-Detected   HCG Qualitative Ur    Collection Time: 06/02/21  5:58 AM   Result Value Ref Range    Beta-Hcg Urine Negative Negative   Histology Request    Collection Time: 06/02/21 10:20 AM   Result Value Ref Range    Pathology Request Sent to Histo        PLAN:  Continue any current medications.  (See Med List for details.)  Return to clinic: in 4 week(s).    Nystatin ointment ordered.  Patient will apply to area 2-3 times daily.    Follow-up in approximately 1 month

## 2021-06-16 NOTE — NON-PROVIDER
Pt here for post-op appointment.  Had surgery on 06/02/2021  Phone # [phone]  Pharmacy verified.

## 2021-07-19 ENCOUNTER — POST PARTUM (OUTPATIENT)
Dept: OBGYN | Facility: CLINIC | Age: 36
End: 2021-07-19
Payer: COMMERCIAL

## 2021-07-19 DIAGNOSIS — Z48.89 POSTOPERATIVE VISIT: ICD-10-CM

## 2021-07-19 PROCEDURE — 99024 POSTOP FOLLOW-UP VISIT: CPT | Performed by: OBSTETRICS & GYNECOLOGY

## 2021-07-19 NOTE — PROGRESS NOTES
Chief complaint: Postoperative visit    SUBJECTIVE:  Jyotsna Franks presents to the clinic 6 weeks following total laparoscopic hysterectomy with bilateral salpingectomy, Osei's culdoplasty, cystoscopy    Patient reports occasional pain, occurring a few times per week when laying in bed and switch positions or when she moves suddenly. She reports the pain is deep in her pelvis, described as sharp. No vaginal bleeding    Eating a regular diet without difficulty.   Bowel movement are Normal.   .   Patient Denies Incisional pain, drainage or redness    OBJECTIVE:  There were no vitals taken for this visit.  Current Outpatient Medications on File Prior to Visit   Medication Sig Dispense Refill   • nystatin (MYCOSTATIN) 680607 UNIT/GM Ointment Apply to affected area 2-3 times daily (Patient not taking: Reported on 7/19/2021) 30 g 1   • MethylPREDNISolone (MEDROL DOSEPAK) 4 MG Tablet Therapy Pack Use as directed (Patient not taking: Reported on 2/27/2020) 1 Kit 0   • loratadine (CLARITIN REDITABS) 10 MG dissolvable tablet Take 1 Tab by mouth every day. (Patient not taking: Reported on 5/17/2021) 30 Tab 0   • omega-3 acid ethyl esters (LOVAZA) 1 GM capsule Take 1 Cap by mouth 4 times a day. (Patient not taking: Reported on 5/14/2019) 120 Cap 1   • omeprazole (PRILOSEC) 20 MG delayed-release capsule Take 1 Cap by mouth every morning before breakfast. (Patient not taking: Reported on 5/14/2019) 30 Cap 5     No current facility-administered medications on file prior to visit.       Constitutional:  alert, healthy, no distress.  Abdomen:  soft, bowel sounds active, non-tender, non-distended.  Incision:  healing well, no drainage, no erythema, no hernia, no seroma, no swelling, no dehiscence, incision well approximated.  Pelvic: Normal-appearing external genitalia, normal-appearing vaginal mucosa. Cuff appears closed on examination. Sutures are still present within the vagina. Bimanual exam shows an intact cuff  with no evidence of dehiscence. There is a small amount of tenderness palpation at the cuff, especially where the Osei's culdoplasty suture was placed. Likely the source of her pain    IMPRESSION: Doing well postoperatively.  Pt is to increase activities as tolerated.    Lab: No results found for this or any previous visit (from the past 1008 hour(s)).    PLAN:  Continue any current medications.  (See Med List for details.)  Return to clinic: in 4 week(s).    Findings discussed with patient. Pain likely secondary to the healing process coupled with the sutures in the pelvis. Discussed with patient that this should improve in the next few weeks. No evidence of infection or dehiscence noted.    All questions answered

## 2021-07-19 NOTE — NON-PROVIDER
Pt here post op from [facility] B[facility] Emiliano Culdoplasty and cystoscopy   Pt states feeling fine, but states having some cramps when she moves or lays down, states she has been sweating a lot. States having itching as well    Good # [phone]  Pharmacy verified.  BP: 130/84  Weight : 149 lbs

## 2022-01-16 ENCOUNTER — APPOINTMENT (OUTPATIENT)
Dept: RADIOLOGY | Facility: MEDICAL CENTER | Age: 37
End: 2022-01-16
Attending: EMERGENCY MEDICINE
Payer: COMMERCIAL

## 2022-01-16 ENCOUNTER — HOSPITAL ENCOUNTER (EMERGENCY)
Facility: MEDICAL CENTER | Age: 37
End: 2022-01-16
Attending: EMERGENCY MEDICINE
Payer: COMMERCIAL

## 2022-01-16 VITALS
HEART RATE: 90 BPM | WEIGHT: 149 LBS | SYSTOLIC BLOOD PRESSURE: 117 MMHG | DIASTOLIC BLOOD PRESSURE: 73 MMHG | OXYGEN SATURATION: 97 % | HEIGHT: 56 IN | BODY MASS INDEX: 33.52 KG/M2 | TEMPERATURE: 97.8 F | RESPIRATION RATE: 18 BRPM

## 2022-01-16 DIAGNOSIS — S83.8X2A SPRAIN OF OTHER LIGAMENT OF LEFT KNEE, INITIAL ENCOUNTER: ICD-10-CM

## 2022-01-16 DIAGNOSIS — S40.011A CONTUSION OF RIGHT SHOULDER, INITIAL ENCOUNTER: ICD-10-CM

## 2022-01-16 PROCEDURE — A9270 NON-COVERED ITEM OR SERVICE: HCPCS | Performed by: EMERGENCY MEDICINE

## 2022-01-16 PROCEDURE — 700102 HCHG RX REV CODE 250 W/ 637 OVERRIDE(OP): Performed by: EMERGENCY MEDICINE

## 2022-01-16 PROCEDURE — 73030 X-RAY EXAM OF SHOULDER: CPT | Mod: RT

## 2022-01-16 PROCEDURE — 99284 EMERGENCY DEPT VISIT MOD MDM: CPT

## 2022-01-16 PROCEDURE — 73564 X-RAY EXAM KNEE 4 OR MORE: CPT | Mod: LT

## 2022-01-16 PROCEDURE — 700111 HCHG RX REV CODE 636 W/ 250 OVERRIDE (IP): Performed by: EMERGENCY MEDICINE

## 2022-01-16 PROCEDURE — 73590 X-RAY EXAM OF LOWER LEG: CPT | Mod: LT

## 2022-01-16 RX ORDER — ONDANSETRON 4 MG/1
4 TABLET, ORALLY DISINTEGRATING ORAL ONCE
Status: COMPLETED | OUTPATIENT
Start: 2022-01-16 | End: 2022-01-16

## 2022-01-16 RX ORDER — IBUPROFEN 600 MG/1
600 TABLET ORAL ONCE
Status: COMPLETED | OUTPATIENT
Start: 2022-01-16 | End: 2022-01-16

## 2022-01-16 RX ADMIN — IBUPROFEN 600 MG: 600 TABLET, FILM COATED ORAL at 19:30

## 2022-01-16 RX ADMIN — ONDANSETRON 4 MG: 4 TABLET, ORALLY DISINTEGRATING ORAL at 19:30

## 2022-01-16 ASSESSMENT — PAIN DESCRIPTION - PAIN TYPE: TYPE: ACUTE PAIN

## 2022-01-16 ASSESSMENT — LIFESTYLE VARIABLES: DO YOU DRINK ALCOHOL: NO

## 2022-01-17 NOTE — ED PROVIDER NOTES
ED Provider Note    CHIEF COMPLAINT  Chief Complaint   Patient presents with   • Knee Pain     c/o pain in left knee after she flipped over while sledding, states left leg went backwards. pt has difficulty ambulating due to pain. rates as 8/10. denies numbness/tingling.        HPI  Jyotsna Franks is a 36 y.o. female who presents to the emergency department complaining of left knee pain after sledding accident.  The patient was sliding down a hill at a fairly high rate of speed.  She began to get out of control and try to use her foot to stop her left leg twisted behind her.  She developed abrupt onset of left knee pain and feeling of crepitus.  She also is pain in the proximal fibula.  She fell forward and also injured her right shoulder.  She did not hit her head or get knocked out.  Denies any injury to her chest abdomen or pelvis.  No syncope.  No abdominal pain nausea or vomiting.  Denies any other acute concerns or complaints.    REVIEW OF SYSTEMS  See HPI for further details. All other systems are negative.    PAST MEDICAL HISTORY  Past Medical History:   Diagnosis Date   • Gestational diabetes     Gestational only with last two pregnancies   • Hyperlipidemia        FAMILY HISTORY  Family History   Problem Relation Age of Onset   • Diabetes Mother    • Heart Disease Maternal Grandmother    • Cancer Maternal Grandmother         Throat Cancer       SOCIAL HISTORY  Social History     Socioeconomic History   • Marital status:      Spouse name: Not on file   • Number of children: Not on file   • Years of education: Not on file   • Highest education level: Not on file   Occupational History   • Not on file   Tobacco Use   • Smoking status: Never Smoker   • Smokeless tobacco: Never Used   Vaping Use   • Vaping Use: Never used   Substance and Sexual Activity   • Alcohol use: Yes     Comment: occasional; 1/month   • Drug use: No   • Sexual activity: Yes     Partners: Male   Other Topics Concern   •  Not on file   Social History Narrative   • Not on file     Social Determinants of Health     Financial Resource Strain:    • Difficulty of Paying Living Expenses: Not on file   Food Insecurity:    • Worried About Running Out of Food in the Last Year: Not on file   • Ran Out of Food in the Last Year: Not on file   Transportation Needs:    • Lack of Transportation (Medical): Not on file   • Lack of Transportation (Non-Medical): Not on file   Physical Activity:    • Days of Exercise per Week: Not on file   • Minutes of Exercise per Session: Not on file   Stress:    • Feeling of Stress : Not on file   Social Connections:    • Frequency of Communication with Friends and Family: Not on file   • Frequency of Social Gatherings with Friends and Family: Not on file   • Attends Worship Services: Not on file   • Active Member of Clubs or Organizations: Not on file   • Attends Club or Organization Meetings: Not on file   • Marital Status: Not on file   Intimate Partner Violence:    • Fear of Current or Ex-Partner: Not on file   • Emotionally Abused: Not on file   • Physically Abused: Not on file   • Sexually Abused: Not on file   Housing Stability:    • Unable to Pay for Housing in the Last Year: Not on file   • Number of Places Lived in the Last Year: Not on file   • Unstable Housing in the Last Year: Not on file       SURGICAL HISTORY  Past Surgical History:   Procedure Laterality Date   • HYSTERECTOMY LAPAROSCOPY  6/2/2021    Procedure: HYSTERECTOMY, LAPAROSCOPIC-TOTAL;  Surgeon: Juan F Hanks M.D.;  Location: SURGERY SAME DAY [facility];  Service: Obstetrics   • SALPINGECTOMY Bilateral 6/2/2021    Procedure: SALPINGECTOMY;  Surgeon: Juan F Hanks M.D.;  Location: SURGERY SAME DAY [facility];  Service: Obstetrics   • CYSTOSCOPY N/A 6/2/2021    Procedure: CYSTOSCOPY;  Surgeon: Juan F Hanks M.D.;  Location: SURGERY SAME DAY [facility];  Service: Obstetrics   • ANTERIOR AND POSTERIOR REPAIR N/A 6/2/2021     "Procedure: MCCALLS CULDOPLASTY;  Surgeon: Juan F Hanks M.D.;  Location: SURGERY SAME DAY [facility];  Service: Obstetrics       CURRENT MEDICATIONS  Home Medications     Reviewed by Rylie Gilman R.N. (Registered Nurse) on 01/16/22 at 1753  Med List Status: Partial   Medication Last Dose Status   loratadine (CLARITIN REDITABS) 10 MG dissolvable tablet  Active   MethylPREDNISolone (MEDROL DOSEPAK) 4 MG Tablet Therapy Pack  Active   nystatin (MYCOSTATIN) 391474 UNIT/GM Ointment  Active   omega-3 acid ethyl esters (LOVAZA) 1 GM capsule  Active   omeprazole (PRILOSEC) 20 MG delayed-release capsule  Active                ALLERGIES  Allergies   Allergen Reactions   • Pcn [Penicillins] Anaphylaxis       PHYSICAL EXAM  VITAL SIGNS: /78   Pulse 100   Temp 36.1 °C (97 °F) (Temporal)   Resp 18   Ht 1.422 m (4' 8\")   Wt 67.6 kg (149 lb)   LMP 04/20/2021   SpO2 96%   BMI 33.41 kg/m²      Constitutional: Well developed, Well nourished, No acute distress, Non-toxic appearance.   HENT: Normocephalic, Atraumatic,  Eyes: PERRL, EOMI, Conjunctiva normal, No discharge.   Neck: Normal range of motion, neck is clinically cleared.   Cardiovascular: Normal heart rate, Normal rhythm, No murmurs, No rubs, No gallops.   Thorax & Lungs: Normal breath sounds, No respiratory distress, No wheezing, No chest tenderness.   Abdomen: Bowel sounds normal, Soft, No tenderness,  Skin: Warm, Dry, No erythema, No rash.   Back: No tenderness, No CVA tenderness.g.   Musculoskeletal: Good range of motion in all major joints.  There is tenderness over the clavicle, and shoulder trapezius and the right shoulder.  Good range of motion of the shoulder itself.  No other tenderness to the upper extremities normal neurovascular exam.  Right lower extremities unremarkable.  Left lower extremity has no tenderness in the hip or thigh.  There is tenderness and swelling around the knee with surrounding joint line.  Is also tenderness at the proximal " fibula below the knee.  The foot is nontender.  Normal distal neurovascular examination.  Neurologic: Alert, No focal deficits noted.   Psychiatric: Affect pleasant          RADIOLOGY/PROCEDURES  DX-KNEE COMPLETE 4+ LEFT   Final Result      Left knee effusion with no acute osseous abnormality.      DX-SHOULDER 2+ RIGHT   Final Result      No evidence of acute fracture or dislocation.      DX-TIBIA AND FIBULA LEFT   Final Result      No radiographic evidence of acute traumatic injury.            COURSE & MEDICAL DECISION MAKING  Pertinent Labs & Imaging studies reviewed. (See chart for details)    Patient presents with mostly left knee pain difficulty bearing weight and swelling of the left knee.  She also is mild right shoulder pain.  She did not hit her head or got knocked out she is a clear sensorium does not need a head CT.  Neck and back are nontender and she has no evidence of a spinal or neck fracture.  She has no chest tenderness.  She has no abdominal tenderness or abdominal pain.  No pelvic pain or tenderness.  Do not think she requires imaging of these areas.  Right lower extremity left upper extremity unremarkable.  She has diffuse tenderness over the trapezius and around the right shoulder.  X-rays obtained this is negative.  She is good range of motion.  I do not think a sling is necessary.  Is not referred pain from her abdomen.    Her left knee is tender and swollen.  X-rays are negative except for an effusion.  She has good range of motion of flexion about 90 degrees although she has some pain with attempted go beyond that.  She has pain and possible ligamentous laxity of the ACL.  She is unable to tolerate collateral ligament testing.    I have given her ibuprofen for pain.  She is put in a knee immobilizer and she is given crutches.  The plan is to treat this like a ligamentous injury and possible internal derangement.  Rest, over-the-counter pain medicines and follow-up with Ortho.  She will  return the emergency room for worsening pain for any numbness or other concerns.  Questions were answered, agreeable to plan.    The patient was noted to have elevated blood pressure while in the ER and was counseled to see their doctor within one wee to have this rechecked.    Patient will be rechecked in the splint prior to discharge.    Keyon Bourgeois M.D.  [address]  [address]  [facility] [address]  [phone]            FINAL IMPRESSION  1. Sprain of other ligament of left knee, initial encounter     2. Contusion of right shoulder, initial encounter         2.   3.         Electronically signed by: Jarad Araiza M.D., 1/16/2022 7:05 PM

## 2022-01-17 NOTE — ED NOTES
Pt cleared for discharge by ERP.    Pt discharged with family in stable condition. Discharge instructions given and explained; pt/ family verbalized understanding.

## 2022-01-17 NOTE — DISCHARGE INSTRUCTIONS
Use immobilizer and crutches.  Rest.  If more pain, any numbness, weakness or other concerns.  Follow-up with orthopedic doctor this week.  Take ibuprofen and Tylenol for pain.

## 2022-01-17 NOTE — ED NOTES
"Pt resting quietly in gurney. Pt still c/o left knee pain but stated \"okay when I'm not moving\". Pt is in no apparent distress with stable VS. Will continue to monitor pt. Family at bedside.  "

## 2022-02-03 ENCOUNTER — HOSPITAL ENCOUNTER (OUTPATIENT)
Facility: MEDICAL CENTER | Age: 37
End: 2022-02-03
Attending: ORTHOPAEDIC SURGERY | Admitting: ORTHOPAEDIC SURGERY
Payer: COMMERCIAL

## 2022-02-03 DIAGNOSIS — S83.512A NEW ACL TEAR, LEFT, INITIAL ENCOUNTER: ICD-10-CM

## 2022-02-03 DIAGNOSIS — M23.8X2 MCL DEFICIENCY, KNEE, LEFT: ICD-10-CM

## 2022-03-09 ENCOUNTER — PRE-ADMISSION TESTING (OUTPATIENT)
Dept: ADMISSIONS | Facility: MEDICAL CENTER | Age: 37
End: 2022-03-09
Attending: ORTHOPAEDIC SURGERY
Payer: COMMERCIAL

## 2022-03-09 VITALS — HEIGHT: 56 IN | WEIGHT: 146 LBS | BODY MASS INDEX: 32.84 KG/M2

## 2022-03-09 NOTE — PREPROCEDURE INSTRUCTIONS
Started telephone appt. The pt needs HA1C due to hx of high blood sugar. Reports not having lab work for years. Difficult to understand even with  and advise in-person pre-admit. appt. Scheduled for 3-16 so covid test can also be done. Emailed map.

## 2022-03-16 ENCOUNTER — PRE-ADMISSION TESTING (OUTPATIENT)
Dept: ADMISSIONS | Facility: MEDICAL CENTER | Age: 37
End: 2022-03-16
Attending: ORTHOPAEDIC SURGERY
Payer: COMMERCIAL

## 2022-03-16 VITALS — BODY MASS INDEX: 32.43 KG/M2 | WEIGHT: 144.18 LBS | HEIGHT: 56 IN

## 2022-03-16 DIAGNOSIS — Z01.812 PRE-OPERATIVE LABORATORY EXAMINATION: ICD-10-CM

## 2022-03-16 LAB
ANION GAP SERPL CALC-SCNC: 15 MMOL/L (ref 7–16)
BUN SERPL-MCNC: 12 MG/DL (ref 8–22)
CALCIUM SERPL-MCNC: 9.5 MG/DL (ref 8.4–10.2)
CHLORIDE SERPL-SCNC: 100 MMOL/L (ref 96–112)
CO2 SERPL-SCNC: 23 MMOL/L (ref 20–33)
CREAT SERPL-MCNC: 0.37 MG/DL (ref 0.5–1.4)
EST. AVERAGE GLUCOSE BLD GHB EST-MCNC: 189 MG/DL
GFR SERPLBLD CREATININE-BSD FMLA CKD-EPI: 133 ML/MIN/1.73 M 2
GLUCOSE SERPL-MCNC: 193 MG/DL (ref 65–99)
HBA1C MFR BLD: 8.2 % (ref 4–5.6)
POTASSIUM SERPL-SCNC: 3.9 MMOL/L (ref 3.6–5.5)
SODIUM SERPL-SCNC: 138 MMOL/L (ref 135–145)

## 2022-03-16 PROCEDURE — 80048 BASIC METABOLIC PNL TOTAL CA: CPT

## 2022-03-16 PROCEDURE — 83036 HEMOGLOBIN GLYCOSYLATED A1C: CPT

## 2022-03-16 PROCEDURE — 36415 COLL VENOUS BLD VENIPUNCTURE: CPT

## 2022-03-16 PROCEDURE — U0005 INFEC AGEN DETEC AMPLI PROBE: HCPCS

## 2022-03-16 PROCEDURE — C9803 HOPD COVID-19 SPEC COLLECT: HCPCS

## 2022-03-16 PROCEDURE — U0003 INFECTIOUS AGENT DETECTION BY NUCLEIC ACID (DNA OR RNA); SEVERE ACUTE RESPIRATORY SYNDROME CORONAVIRUS 2 (SARS-COV-2) (CORONAVIRUS DISEASE [COVID-19]), AMPLIFIED PROBE TECHNIQUE, MAKING USE OF HIGH THROUGHPUT TECHNOLOGIES AS DESCRIBED BY CMS-2020-01-R: HCPCS

## 2022-03-16 RX ORDER — ACETAMINOPHEN 500 MG
500-1000 TABLET ORAL EVERY 6 HOURS PRN
COMMUNITY
End: 2022-06-10

## 2022-03-16 RX ORDER — DIPHENHYDRAMINE HYDROCHLORIDE 25 MG/1
TABLET ORAL DAILY
COMMUNITY
End: 2022-04-22

## 2022-03-16 NOTE — PREPROCEDURE INSTRUCTIONS
"Preadmit appointment: \" Preparing for your Procedure information\" sheet given to patient with verbal and written instructions. using  Ignacio [id number] as needed per patient.   Patient instructed to continue prescribed medications through the day before surgery, instructed to take the following medications the day of surgery per anesthesia protocol: noneVerbal and written instructions on covid symptoms to watch for given to patient and patient instructed to call MD if any symptoms develop prior to surgery/procedure. Pt denies issues with anesthesia METS >4  "

## 2022-03-17 LAB
SARS-COV-2 RNA RESP QL NAA+PROBE: NOTDETECTED
SPECIMEN SOURCE: NORMAL

## 2022-03-23 SDOH — ECONOMIC STABILITY: HOUSING INSECURITY
IN THE LAST 12 MONTHS, WAS THERE A TIME WHEN YOU DID NOT HAVE A STEADY PLACE TO SLEEP OR SLEPT IN A SHELTER (INCLUDING NOW)?: NO

## 2022-03-23 SDOH — ECONOMIC STABILITY: TRANSPORTATION INSECURITY
IN THE PAST 12 MONTHS, HAS THE LACK OF TRANSPORTATION KEPT YOU FROM MEDICAL APPOINTMENTS OR FROM GETTING MEDICATIONS?: NO

## 2022-03-23 SDOH — ECONOMIC STABILITY: HOUSING INSECURITY: IN THE LAST 12 MONTHS, HOW MANY PLACES HAVE YOU LIVED?: 1

## 2022-03-23 SDOH — ECONOMIC STABILITY: FOOD INSECURITY: WITHIN THE PAST 12 MONTHS, YOU WORRIED THAT YOUR FOOD WOULD RUN OUT BEFORE YOU GOT MONEY TO BUY MORE.: NEVER TRUE

## 2022-03-23 SDOH — ECONOMIC STABILITY: INCOME INSECURITY: IN THE LAST 12 MONTHS, WAS THERE A TIME WHEN YOU WERE NOT ABLE TO PAY THE MORTGAGE OR RENT ON TIME?: NO

## 2022-03-23 SDOH — ECONOMIC STABILITY: TRANSPORTATION INSECURITY
IN THE PAST 12 MONTHS, HAS LACK OF RELIABLE TRANSPORTATION KEPT YOU FROM MEDICAL APPOINTMENTS, MEETINGS, WORK OR FROM GETTING THINGS NEEDED FOR DAILY LIVING?: NO

## 2022-03-23 SDOH — HEALTH STABILITY: PHYSICAL HEALTH: ON AVERAGE, HOW MANY DAYS PER WEEK DO YOU ENGAGE IN MODERATE TO STRENUOUS EXERCISE (LIKE A BRISK WALK)?: 1 DAY

## 2022-03-23 SDOH — ECONOMIC STABILITY: FOOD INSECURITY: WITHIN THE PAST 12 MONTHS, THE FOOD YOU BOUGHT JUST DIDN'T LAST AND YOU DIDN'T HAVE MONEY TO GET MORE.: NEVER TRUE

## 2022-03-23 SDOH — ECONOMIC STABILITY: INCOME INSECURITY: HOW HARD IS IT FOR YOU TO PAY FOR THE VERY BASICS LIKE FOOD, HOUSING, MEDICAL CARE, AND HEATING?: NOT VERY HARD

## 2022-03-23 SDOH — HEALTH STABILITY: MENTAL HEALTH
STRESS IS WHEN SOMEONE FEELS TENSE, NERVOUS, ANXIOUS, OR CAN'T SLEEP AT NIGHT BECAUSE THEIR MIND IS TROUBLED. HOW STRESSED ARE YOU?: ONLY A LITTLE

## 2022-03-23 SDOH — HEALTH STABILITY: PHYSICAL HEALTH: ON AVERAGE, HOW MANY MINUTES DO YOU ENGAGE IN EXERCISE AT THIS LEVEL?: 30 MIN

## 2022-03-23 SDOH — ECONOMIC STABILITY: TRANSPORTATION INSECURITY
IN THE PAST 12 MONTHS, HAS LACK OF TRANSPORTATION KEPT YOU FROM MEETINGS, WORK, OR FROM GETTING THINGS NEEDED FOR DAILY LIVING?: NO

## 2022-03-23 ASSESSMENT — SOCIAL DETERMINANTS OF HEALTH (SDOH)
DO YOU BELONG TO ANY CLUBS OR ORGANIZATIONS SUCH AS CHURCH GROUPS UNIONS, FRATERNAL OR ATHLETIC GROUPS, OR SCHOOL GROUPS?: NO
DO YOU BELONG TO ANY CLUBS OR ORGANIZATIONS SUCH AS CHURCH GROUPS UNIONS, FRATERNAL OR ATHLETIC GROUPS, OR SCHOOL GROUPS?: NO
WITHIN THE PAST 12 MONTHS, YOU WORRIED THAT YOUR FOOD WOULD RUN OUT BEFORE YOU GOT THE MONEY TO BUY MORE: NEVER TRUE
HOW OFTEN DO YOU HAVE A DRINK CONTAINING ALCOHOL: 2-4 TIMES A MONTH
HOW OFTEN DO YOU GET TOGETHER WITH FRIENDS OR RELATIVES?: TWICE A WEEK
HOW OFTEN DO YOU ATTEND CHURCH OR RELIGIOUS SERVICES?: MORE THAN 4 TIMES PER YEAR
HOW OFTEN DO YOU HAVE SIX OR MORE DRINKS ON ONE OCCASION: NEVER
IN A TYPICAL WEEK, HOW MANY TIMES DO YOU TALK ON THE PHONE WITH FAMILY, FRIENDS, OR NEIGHBORS?: MORE THAN THREE TIMES A WEEK
IN A TYPICAL WEEK, HOW MANY TIMES DO YOU TALK ON THE PHONE WITH FAMILY, FRIENDS, OR NEIGHBORS?: MORE THAN THREE TIMES A WEEK
HOW MANY DRINKS CONTAINING ALCOHOL DO YOU HAVE ON A TYPICAL DAY WHEN YOU ARE DRINKING: 1 OR 2
HOW OFTEN DO YOU ATTENT MEETINGS OF THE CLUB OR ORGANIZATION YOU BELONG TO?: NEVER
HOW HARD IS IT FOR YOU TO PAY FOR THE VERY BASICS LIKE FOOD, HOUSING, MEDICAL CARE, AND HEATING?: NOT VERY HARD
HOW OFTEN DO YOU ATTEND CHURCH OR RELIGIOUS SERVICES?: MORE THAN 4 TIMES PER YEAR
HOW OFTEN DO YOU GET TOGETHER WITH FRIENDS OR RELATIVES?: TWICE A WEEK
HOW OFTEN DO YOU ATTENT MEETINGS OF THE CLUB OR ORGANIZATION YOU BELONG TO?: NEVER

## 2022-03-23 ASSESSMENT — LIFESTYLE VARIABLES
HOW OFTEN DO YOU HAVE SIX OR MORE DRINKS ON ONE OCCASION: NEVER
HOW MANY STANDARD DRINKS CONTAINING ALCOHOL DO YOU HAVE ON A TYPICAL DAY: 1 OR 2
HOW OFTEN DO YOU HAVE A DRINK CONTAINING ALCOHOL: 2-4 TIMES A MONTH

## 2022-03-25 ENCOUNTER — OFFICE VISIT (OUTPATIENT)
Dept: INTERNAL MEDICINE | Facility: OTHER | Age: 37
End: 2022-03-25
Payer: COMMERCIAL

## 2022-03-25 VITALS
HEIGHT: 58 IN | WEIGHT: 151.8 LBS | TEMPERATURE: 97.5 F | SYSTOLIC BLOOD PRESSURE: 122 MMHG | OXYGEN SATURATION: 93 % | DIASTOLIC BLOOD PRESSURE: 88 MMHG | HEART RATE: 78 BPM | BODY MASS INDEX: 31.87 KG/M2

## 2022-03-25 DIAGNOSIS — E66.9 OBESITY (BMI 30-39.9): ICD-10-CM

## 2022-03-25 DIAGNOSIS — R68.82 LOW LIBIDO: ICD-10-CM

## 2022-03-25 DIAGNOSIS — E13.9 DIABETES 1.5, MANAGED AS TYPE 2 (HCC): ICD-10-CM

## 2022-03-25 DIAGNOSIS — S83.512D RUPTURE OF ANTERIOR CRUCIATE LIGAMENT OF LEFT KNEE, SUBSEQUENT ENCOUNTER: ICD-10-CM

## 2022-03-25 PROCEDURE — 99204 OFFICE O/P NEW MOD 45 MIN: CPT | Mod: GC | Performed by: STUDENT IN AN ORGANIZED HEALTH CARE EDUCATION/TRAINING PROGRAM

## 2022-03-25 RX ORDER — POLYMYXIN B SULFATE AND TRIMETHOPRIM 1; 10000 MG/ML; [USP'U]/ML
1 SOLUTION OPHTHALMIC EVERY 4 HOURS
COMMUNITY
End: 2022-03-27

## 2022-03-25 RX ORDER — ATORVASTATIN CALCIUM 20 MG/1
20 TABLET, FILM COATED ORAL NIGHTLY
Qty: 30 TABLET | Refills: 3 | Status: SHIPPED | OUTPATIENT
Start: 2022-03-25 | End: 2022-03-27

## 2022-03-25 ASSESSMENT — ENCOUNTER SYMPTOMS
VOMITING: 0
FOCAL WEAKNESS: 0
SINUS PAIN: 0
PALPITATIONS: 0
FEVER: 0
SORE THROAT: 0
BRUISES/BLEEDS EASILY: 0
DEPRESSION: 0
DOUBLE VISION: 0
HEMOPTYSIS: 0
BACK PAIN: 0
PHOTOPHOBIA: 0
HEADACHES: 0
NECK PAIN: 0
SPUTUM PRODUCTION: 0
BLOOD IN STOOL: 0
HEARTBURN: 0
CHILLS: 0
WEIGHT LOSS: 0
MYALGIAS: 0
SENSORY CHANGE: 0
ORTHOPNEA: 0
NERVOUS/ANXIOUS: 0
SHORTNESS OF BREATH: 0
SPEECH CHANGE: 0
ABDOMINAL PAIN: 0
BLURRED VISION: 0
NAUSEA: 0
COUGH: 0
DIZZINESS: 0

## 2022-03-25 ASSESSMENT — LIFESTYLE VARIABLES: SUBSTANCE_ABUSE: 0

## 2022-03-25 ASSESSMENT — PATIENT HEALTH QUESTIONNAIRE - PHQ9: CLINICAL INTERPRETATION OF PHQ2 SCORE: 0

## 2022-03-25 NOTE — PROGRESS NOTES
New Patient    Jyotsna Franks is a 36 y.o. female who presents today with the following:    CC: Establish Care with Primary Care Physician     HPI:    Jyotsna is a 35 y/o female with recent diagnosis of Diabetes after obtaining pre-op labs after sustaining knee injury (ACL, MCL injury). She comes to establish care with me to get proper blood glucose control so she can have surgery.  Patient stated that she was never aware of having pre-diabetes, but she has history of Gestational diabetes during her 4th pregnancy 12 years ago, she had a 5th pregnancy 7 years ago but she did not have gestational diabetes then.  Records show that in recent years her BG was elevated 120-130 range, also mixed DLD.  Patient has mild/moderate seasonal allergies, but otherwise she was not aware of any other medical problems.  Patient also mentioned that over the past 3-4 years has had dyspareunia and low libido. She underwent total hysterectomy 1 year ago, but NO oophorectomy.  She is fully vaccinated for COVID, also had Influenza vaccine this season.      ROS:       Review of Systems   Constitutional: Negative for chills, fever, malaise/fatigue and weight loss.   HENT: Negative for congestion, ear discharge, sinus pain, sore throat and tinnitus.    Eyes: Negative for blurred vision, double vision and photophobia.   Respiratory: Negative for cough, hemoptysis, sputum production and shortness of breath.    Cardiovascular: Negative for chest pain, palpitations, orthopnea and leg swelling.   Gastrointestinal: Negative for abdominal pain, blood in stool, heartburn, melena, nausea and vomiting.   Genitourinary: Negative for dysuria, frequency, hematuria and urgency.        Positive for Dyspareunia and vaginal dryness.   Musculoskeletal: Negative for back pain, joint pain, myalgias and neck pain.   Skin: Negative for rash.   Neurological: Negative for dizziness, sensory change, speech change, focal weakness and headaches.    Endo/Heme/Allergies: Does not bruise/bleed easily.   Psychiatric/Behavioral: Negative for depression, substance abuse and suicidal ideas. The patient is not nervous/anxious.          Past Medical History:   Diagnosis Date   • Dental disorder     broken left side tooth   • Gestational diabetes     Gestational only with last two pregnancies   • Hyperlipidemia        Past Surgical History:   Procedure Laterality Date   • HYSTERECTOMY LAPAROSCOPY  6/2/2021    Procedure: HYSTERECTOMY, LAPAROSCOPIC-TOTAL;  Surgeon: Juan F Hanks M.D.;  Location: SURGERY SAME DAY [facility];  Service: Obstetrics   • SALPINGECTOMY Bilateral 6/2/2021    Procedure: SALPINGECTOMY;  Surgeon: Juan F Hanks M.D.;  Location: SURGERY SAME DAY [facility];  Service: Obstetrics   • CYSTOSCOPY N/A 6/2/2021    Procedure: CYSTOSCOPY;  Surgeon: Juan F Hanks M.D.;  Location: SURGERY SAME DAY [facility];  Service: Obstetrics   • ANTERIOR AND POSTERIOR REPAIR N/A 6/2/2021    Procedure: MCCALLS CULDOPLASTY;  Surgeon: Juan F Hanks M.D.;  Location: SURGERY SAME DAY [facility];  Service: Obstetrics       Family History   Problem Relation Age of Onset   • Diabetes Mother    • Heart Disease Maternal Grandmother    • Cancer Maternal Grandmother         Throat Cancer       Social History     Tobacco Use   • Smoking status: Never Smoker   • Smokeless tobacco: Never Used   Vaping Use   • Vaping Use: Never used   Substance Use Topics   • Alcohol use: Yes     Comment: occasional; 1/month   • Drug use: No       Current Outpatient Medications   Medication Sig Dispense Refill   • polymixin-trimethoprim (POLYTRIM) 61428-9.1 UNIT/ML-% Solution Administer 1 Drop into both eyes every 4 hours.     • metformin (GLUCOPHAGE) 1000 MG tablet Take 1 Tablet by mouth 2 times a day with meals. 60 Tablet 3   • atorvastatin (LIPITOR) 20 MG Tab Take 1 Tablet by mouth every evening. 30 Tablet 3   • acetaminophen (TYLENOL) 500 MG Tab Take 500-1,000 mg by mouth every 6  "hours as needed.     • Tetrahydrozoline HCl (VISINE OP) Administer  into affected eye(s) as needed. Allergy relief     • Biotin 5 MG Cap Take  by mouth every day.     • Cetirizine HCl (ZYRTEC ALLERGY PO) Take  by mouth every day.       No current facility-administered medications for this visit.       Physical Exam:  /88 (BP Location: Right arm, Patient Position: Sitting, BP Cuff Size: Adult)   Pulse 78   Temp 36.4 °C (97.5 °F) (Temporal)   Ht 1.461 m (4' 9.5\")   Wt 68.9 kg (151 lb 12.8 oz)   LMP 2021   SpO2 93%   BMI 32.28 kg/m²      General: Well-developed, well-nourished female in no distress  HEENT: Conjuntiva and lids are within normal limits.  External auditory canals are within normal limits.  Tympanic membranes are clear with a normal light refflex.  Nasal mucosa is normal.  Oral pharynx is normal and free of exudate.  Neck: Supple, non-tender with nothyromegaly noted.  Lympatic: Pre and Post auricular, sub-mandibular, anterior and posterior cervical and supraclavicular areas are without notable lymphadenopathy  Lungs: Clear to auscultation and perucssion bilaterally with good respiratory effort.  No wheezes, crackes or rhonci are heard.  Heart: Normal rate, regular rhythm with no murmurs, rubs or gallops heard.  Abdomen: Soft, non-tender, non-distended with normal-active bowel sounds.  Nor organomegaly noted.  Extremites: No lower extremity edema. She comes with left leg immobilizer (injured ACL, MCL).  Psych: Patient is awake, alert and oriented with recent and remote memory intact. Mood and affect are normal.       Assessment and Plan:       1. Diabetes 1.5, managed as type 2 (HCC)  - Recent diagnosis 3/15/22 CMP: B, A1c:8.2%  - She was not aware of BG problems  - Remote history of Gestational Diabetes during her 4th pregnancy 12 years ago, but no BG issues during her last pregnancy 7 years ago.  - Chart review early last year CMP x 3 with 's-130's  - Patient denied any " symptoms concerning for diabetes such as polyphagia, polydipsia, polyuria.  - Patient's BMI is 32, also admitted poor diet habits  - Diagnosis was done after routine pre-op labs.        PLAN:  - Metformin 1 gr BID  - Lipitor 20 mg daily  - Strict Diabetic diet  - Exercise as tolerated, not involving walking (injury)  - Referral for Dietician  - Labs 2-3 days prior next visit  - Follow up in 4 weeks     2. Low libido  - Patient states Low libido and dyspareunia for few years  - Patient had total Hysterectomy last year, but Ovaries preserved   - No other medical issues besides recent diabetes diagnosis  - Denied marital issues       PLAN:  - FSH/LH, Estradiol  - Follow up in 4 weeks    3. Rupture of anterior cruciate ligament of left knee, subsequent encounter  - Patient had a sled accident on 1/16/22  - She is following with Orthopaedic surgeon Dr. Lozano at [facility]  - MRI showed: ACL tear, MCL high grade injury  - She needs surgical repair, but due to diagnosis of DM surgeon recommending surgery delay after patient gets better BG control      4. Obesity (BMI 30-39.9)  - Patient aware of weight problem  - Referral to dietitian  - We discussed in details about making significant lifestyle modification at home, everyone including  and children  - CTM                Problem List Items Addressed This Visit    None     Visit Diagnoses     Diabetes 1.5, managed as type 2 (HCC)        Relevant Medications    metformin (GLUCOPHAGE) 1000 MG tablet    atorvastatin (LIPITOR) 20 MG Tab    Other Relevant Orders    Basic Metabolic Panel    MICROALBUMIN CREAT RATIO URINE    Lipid Profile    Low libido        Relevant Orders    FSH/LH    ESTRADIOL    Rupture of anterior cruciate ligament of left knee, subsequent encounter        Obesity (BMI 30-39.9)        Relevant Medications    metformin (GLUCOPHAGE) 1000 MG tablet          No follow-ups on file.    There are no Patient Instructions on file for this visit.    Signed by: Lei RODRIGUEZ  COMPA [place].      Dr. Issac M.D. PGY-2  [facility]

## 2022-03-27 ENCOUNTER — OFFICE VISIT (OUTPATIENT)
Dept: URGENT CARE | Facility: PHYSICIAN GROUP | Age: 37
End: 2022-03-27
Payer: COMMERCIAL

## 2022-03-27 VITALS
BODY MASS INDEX: 31.28 KG/M2 | TEMPERATURE: 96 F | SYSTOLIC BLOOD PRESSURE: 102 MMHG | RESPIRATION RATE: 16 BRPM | WEIGHT: 149 LBS | HEIGHT: 58 IN | DIASTOLIC BLOOD PRESSURE: 70 MMHG | OXYGEN SATURATION: 96 % | HEART RATE: 98 BPM

## 2022-03-27 DIAGNOSIS — J32.9 RHINOSINUSITIS: ICD-10-CM

## 2022-03-27 DIAGNOSIS — R05.9 COUGH: ICD-10-CM

## 2022-03-27 PROCEDURE — 99213 OFFICE O/P EST LOW 20 MIN: CPT | Performed by: FAMILY MEDICINE

## 2022-03-27 RX ORDER — DOXYCYCLINE HYCLATE 100 MG
100 TABLET ORAL 2 TIMES DAILY
Qty: 14 TABLET | Refills: 0 | Status: SHIPPED | OUTPATIENT
Start: 2022-03-27 | End: 2022-04-03

## 2022-03-27 RX ORDER — BENZONATATE 200 MG/1
200 CAPSULE ORAL 3 TIMES DAILY PRN
Qty: 30 CAPSULE | Refills: 0 | Status: SHIPPED | OUTPATIENT
Start: 2022-03-27 | End: 2022-04-22

## 2022-03-27 ASSESSMENT — ENCOUNTER SYMPTOMS
NAUSEA: 0
EYE REDNESS: 0
VOMITING: 0
MYALGIAS: 0
EYE DISCHARGE: 0
WEIGHT LOSS: 0

## 2022-03-27 NOTE — PROGRESS NOTES
"Doris Franks is a 36 y.o. female who presents with Cough (Dry cough, sore throat, red eyes, nasal congestion. Onset 10 days. )            1.5 weeks nasal congestion and sinus pressure.  PMH sinusitis.  No sinus surgeries.  Nonproductive cough.  Sore throat.  Eyes are red and draining.  No contact lens use.  No fever.  No shortness of breath or wheezing.  No other aggravating or alleviating factors.      Review of Systems   Constitutional: Negative for malaise/fatigue and weight loss.   Eyes: Negative for discharge and redness.   Gastrointestinal: Negative for nausea and vomiting.   Musculoskeletal: Negative for joint pain and myalgias.   Skin: Negative for itching and rash.              Objective     /70 (BP Location: Left arm, Patient Position: Sitting, BP Cuff Size: Adult)   Pulse 98   Temp (!) 35.6 °C (96 °F) (Temporal)   Resp 16   Ht 1.461 m (4' 9.5\")   Wt 67.6 kg (149 lb)   LMP 04/20/2021   SpO2 96%   BMI 31.68 kg/m²      Physical Exam  Constitutional:       General: She is not in acute distress.     Appearance: She is well-developed.   HENT:      Head: Normocephalic and atraumatic.      Right Ear: Tympanic membrane normal.      Left Ear: Tympanic membrane normal.      Nose: Congestion present.      Comments: Area of perceived pain bilateral frontal sinuses.  PND.  Eyes:      Conjunctiva/sclera: Conjunctivae normal.   Cardiovascular:      Rate and Rhythm: Normal rate and regular rhythm.      Heart sounds: Normal heart sounds. No murmur heard.  Pulmonary:      Effort: Pulmonary effort is normal.      Breath sounds: Normal breath sounds. No wheezing.   Skin:     General: Skin is warm and dry.      Findings: No rash.   Neurological:      Mental Status: She is alert and oriented to person, place, and time.                             Assessment & Plan         1. Rhinosinusitis  doxycycline (VIBRAMYCIN) 100 MG Tab   2. Cough  benzonatate (TESSALON) 200 MG capsule "     Differential diagnosis, natural history, supportive care, and indications for immediate follow-up discussed at length.     Nasal saline, decongestant, nasal CS.

## 2022-04-12 ENCOUNTER — APPOINTMENT (OUTPATIENT)
Dept: RADIOLOGY | Facility: MEDICAL CENTER | Age: 37
End: 2022-04-12
Attending: EMERGENCY MEDICINE
Payer: COMMERCIAL

## 2022-04-12 ENCOUNTER — HOSPITAL ENCOUNTER (EMERGENCY)
Facility: MEDICAL CENTER | Age: 37
End: 2022-04-12
Attending: EMERGENCY MEDICINE
Payer: COMMERCIAL

## 2022-04-12 VITALS
HEART RATE: 72 BPM | SYSTOLIC BLOOD PRESSURE: 111 MMHG | BODY MASS INDEX: 30.85 KG/M2 | HEIGHT: 57 IN | RESPIRATION RATE: 15 BRPM | TEMPERATURE: 97.2 F | DIASTOLIC BLOOD PRESSURE: 62 MMHG | WEIGHT: 143 LBS | OXYGEN SATURATION: 93 %

## 2022-04-12 DIAGNOSIS — R07.9 LEFT-SIDED CHEST PAIN: ICD-10-CM

## 2022-04-12 LAB
ALBUMIN SERPL BCP-MCNC: 4.6 G/DL (ref 3.2–4.9)
ALBUMIN/GLOB SERPL: 1.6 G/DL
ALP SERPL-CCNC: 114 U/L (ref 30–99)
ALT SERPL-CCNC: 35 U/L (ref 2–50)
ANION GAP SERPL CALC-SCNC: 17 MMOL/L (ref 7–16)
AST SERPL-CCNC: 24 U/L (ref 12–45)
BASOPHILS # BLD AUTO: 0.5 % (ref 0–1.8)
BASOPHILS # BLD: 0.05 K/UL (ref 0–0.12)
BILIRUB SERPL-MCNC: 0.4 MG/DL (ref 0.1–1.5)
BUN SERPL-MCNC: 14 MG/DL (ref 8–22)
CALCIUM SERPL-MCNC: 9.7 MG/DL (ref 8.5–10.5)
CHLORIDE SERPL-SCNC: 101 MMOL/L (ref 96–112)
CO2 SERPL-SCNC: 18 MMOL/L (ref 20–33)
CREAT SERPL-MCNC: 0.43 MG/DL (ref 0.5–1.4)
D DIMER PPP IA.FEU-MCNC: 0.32 UG/ML (FEU) (ref 0–0.5)
EOSINOPHIL # BLD AUTO: 0.35 K/UL (ref 0–0.51)
EOSINOPHIL NFR BLD: 3.4 % (ref 0–6.9)
ERYTHROCYTE [DISTWIDTH] IN BLOOD BY AUTOMATED COUNT: 37.9 FL (ref 35.9–50)
GFR SERPLBLD CREATININE-BSD FMLA CKD-EPI: 128 ML/MIN/1.73 M 2
GLOBULIN SER CALC-MCNC: 2.9 G/DL (ref 1.9–3.5)
GLUCOSE SERPL-MCNC: 157 MG/DL (ref 65–99)
HCT VFR BLD AUTO: 40.3 % (ref 37–47)
HGB BLD-MCNC: 14.1 G/DL (ref 12–16)
IMM GRANULOCYTES # BLD AUTO: 0.05 K/UL (ref 0–0.11)
IMM GRANULOCYTES NFR BLD AUTO: 0.5 % (ref 0–0.9)
LIPASE SERPL-CCNC: 29 U/L (ref 11–82)
LYMPHOCYTES # BLD AUTO: 4.08 K/UL (ref 1–4.8)
LYMPHOCYTES NFR BLD: 39.8 % (ref 22–41)
MCH RBC QN AUTO: 29.9 PG (ref 27–33)
MCHC RBC AUTO-ENTMCNC: 35 G/DL (ref 33.6–35)
MCV RBC AUTO: 85.6 FL (ref 81.4–97.8)
MONOCYTES # BLD AUTO: 0.56 K/UL (ref 0–0.85)
MONOCYTES NFR BLD AUTO: 5.5 % (ref 0–13.4)
NEUTROPHILS # BLD AUTO: 5.16 K/UL (ref 2–7.15)
NEUTROPHILS NFR BLD: 50.3 % (ref 44–72)
NRBC # BLD AUTO: 0 K/UL
NRBC BLD-RTO: 0 /100 WBC
PLATELET # BLD AUTO: 372 K/UL (ref 164–446)
PMV BLD AUTO: 9.7 FL (ref 9–12.9)
POTASSIUM SERPL-SCNC: 3.9 MMOL/L (ref 3.6–5.5)
PROT SERPL-MCNC: 7.5 G/DL (ref 6–8.2)
RBC # BLD AUTO: 4.71 M/UL (ref 4.2–5.4)
SODIUM SERPL-SCNC: 136 MMOL/L (ref 135–145)
TROPONIN T SERPL-MCNC: <6 NG/L (ref 6–19)
TROPONIN T SERPL-MCNC: <6 NG/L (ref 6–19)
WBC # BLD AUTO: 10.3 K/UL (ref 4.8–10.8)

## 2022-04-12 PROCEDURE — 85025 COMPLETE CBC W/AUTO DIFF WBC: CPT

## 2022-04-12 PROCEDURE — 85379 FIBRIN DEGRADATION QUANT: CPT

## 2022-04-12 PROCEDURE — 93005 ELECTROCARDIOGRAM TRACING: CPT

## 2022-04-12 PROCEDURE — 96374 THER/PROPH/DIAG INJ IV PUSH: CPT

## 2022-04-12 PROCEDURE — 99284 EMERGENCY DEPT VISIT MOD MDM: CPT

## 2022-04-12 PROCEDURE — 71045 X-RAY EXAM CHEST 1 VIEW: CPT

## 2022-04-12 PROCEDURE — 83690 ASSAY OF LIPASE: CPT

## 2022-04-12 PROCEDURE — 93005 ELECTROCARDIOGRAM TRACING: CPT | Performed by: EMERGENCY MEDICINE

## 2022-04-12 PROCEDURE — 36415 COLL VENOUS BLD VENIPUNCTURE: CPT

## 2022-04-12 PROCEDURE — 700111 HCHG RX REV CODE 636 W/ 250 OVERRIDE (IP): Performed by: EMERGENCY MEDICINE

## 2022-04-12 PROCEDURE — 84484 ASSAY OF TROPONIN QUANT: CPT | Mod: 91

## 2022-04-12 PROCEDURE — 80053 COMPREHEN METABOLIC PANEL: CPT

## 2022-04-12 RX ORDER — KETOROLAC TROMETHAMINE 30 MG/ML
15 INJECTION, SOLUTION INTRAMUSCULAR; INTRAVENOUS ONCE
Status: COMPLETED | OUTPATIENT
Start: 2022-04-12 | End: 2022-04-12

## 2022-04-12 RX ADMIN — KETOROLAC TROMETHAMINE 15 MG: 30 INJECTION, SOLUTION INTRAMUSCULAR at 21:21

## 2022-04-13 LAB — EKG IMPRESSION: NORMAL

## 2022-04-13 NOTE — ED PROVIDER NOTES
ED Provider Note    CHIEF COMPLAINT  Chief Complaint   Patient presents with   • Chest Pain          • Shortness of Breath     X 20 min       HPI  Jyotsna Franks is a 36 y.o. female who presents today with a department chief complaint of left mid chest discomfort that began 20 minutes prior to arrival.  She states it feels like it squeezing and burning on and off.  With the discomfort she feels like the pain is so bad it made her feel short of breath.  Before today she was actually doing well she did have a recent injury to her left knee and has surgery planned in the future and to start on medications for hyperlipidemia and diabetes.  She denies family history of cardiac disease under the age of 40 she does not smoke she does not drink she does not use estrogen she has not really been immobile but she is wearing a knee brace on the left leg.  She denies any nausea vomiting fevers chills cough congestion or feeling ill before today.  The pain does not radiate to her back to her arm    REVIEW OF SYSTEMS  Positives as above. Pertinent negatives include nausea vomiting fevers chills cough congestion headache chest pain or dyspnea on exertion unilateral bilateral leg swelling jaw pain neck pain arm pain abdominal pain  All other review of systems are negative    PAST MEDICAL HISTORY   has a past medical history of Dental disorder, Gestational diabetes, and Hyperlipidemia.    SOCIAL HISTORY  Social History     Tobacco Use   • Smoking status: Never Smoker   • Smokeless tobacco: Never Used   Vaping Use   • Vaping Use: Never used   Substance and Sexual Activity   • Alcohol use: Not Currently     Comment: 1 PER MONTH   • Drug use: No   • Sexual activity: Yes     Partners: Male     Birth control/protection: None       SURGICAL HISTORY   has a past surgical history that includes hysterectomy laparoscopy (6/2/2021); salpingectomy (Bilateral, 6/2/2021); cystoscopy (N/A, 6/2/2021); and anterior and posterior repair  "(N/A, 6/2/2021).    CURRENT MEDICATIONS  Home Medications     Reviewed by Marita Roth R.N. (Registered Nurse) on 04/12/22 at 2032  Med List Status: <None>   Medication Last Dose Status   acetaminophen (TYLENOL) 500 MG Tab  Active   atorvastatin (LIPITOR) 20 MG Tab  Active   benzonatate (TESSALON) 200 MG capsule  Active   Biotin 5 MG Cap  Active   Cetirizine HCl (ZYRTEC ALLERGY PO)  Active   metformin (GLUCOPHAGE) 1000 MG tablet  Active                ALLERGIES  Allergies   Allergen Reactions   • Pcn [Penicillins] Anaphylaxis   • Other Environmental      pollens       PHYSICAL EXAM  VITAL SIGNS: /89   Pulse 86   Temp (!) 34.9 °C (94.8 °F) (Oral)   Resp (!) 28   Ht 1.448 m (4' 9\")   Wt 64.9 kg (143 lb)   LMP 04/20/2021   SpO2 100%   BMI 30.94 kg/m²    Pulse ox interpretation: I interpret this pulse ox as normal.  Constitutional: Alert in no apparent distress.  HENT: Normocephalic atraumatic, MMM  Eyes: PER, Conjunctiva normal, Non-icteric.   Neck: Normal range of motion, No tenderness, Supple, No stridor.   Cardiovascular: Regular rate and rhythm, no murmurs.   Thorax & Lungs: Normal breath sounds, No respiratory distress, No wheezing, mild central and left-sided tenderness on palpation  Abdomen: Bowel sounds normal, Soft, No tenderness, No pulsatile masses. No peritoneal signs.  Skin: Warm, Dry, No erythema, No rash.   Back: No bony tenderness, No CVA tenderness.   Extremities/MSK: Intact equal distal pulses, No edema, No tenderness, No cyanosis, no major deformities noted  Neurologic: Alert and oriented x3, No focal deficits noted.       DIFFERENTIAL DIAGNOSIS AND WORK UP PLAN    This is a 36 y.o. female who presents with epigastric central chest discomfort a little bit of the left chest wall began prior to arrival she is otherwise healthy states his recent knee injury knee immobilizer which would be kind of her only risk for PE due to the fact that she keeps it and that most of the time D-dimer " will be performed although I doubt PE, I also doubt ACS, seems atypical she is otherwise young and healthy EKG was performed x2 because she appeared uncomfortable which was within normal limits.  This could be esophagitis gastritis peptic ulcer disease it could be ulcer perforation although I doubt it.  She will be treated with Toradol laboratory analysis imaging pain management and reassess    DIAGNOSTIC STUDIES / PROCEDURES    EKG  Results for orders placed or performed during the hospital encounter of 22   EKG   Result Value Ref Range    Report       [facility] Emergency Dept.    Test Date:  2022  Pt Name:    NAGA OROURKE  Department: ER  MRN:        [id number]                      Room:  Gender:     Female                       Technician: [id number]  :        [id number]                   Requested By:ER TRIAGE PROTOCOL  Order #:    [id number]                    Reading MD:    Measurements  Intervals                                Axis  Rate:       82                           P:          18  AL:         98                           QRS:        -8  QRSD:       101                          T:          32  QT:         377  QTc:        441    Interpretive Statements  Sinus rhythm  Short AL interval  Low voltage, precordial leads  Borderline repolarization abnormality  Baseline wander in lead(s) II,III,V1,V3  Compared to ECG 2008 02:36:07  Short AL interval now present  T-wave abnormality no longer present         LABS  Pertinent Lab Findings  CBC within normal limits CMP does have mild acidosis with an anion gap otherwise normal normal dimer normal troponin x2 normal lipase      RADIOLOGY  DX-CHEST-PORTABLE (1 VIEW)   Final Result      No evidence of acute cardiopulmonary process.        The radiologist's interpretation of all radiological studies have been reviewed by me.      COURSE & MEDICAL DECISION MAKING  Pertinent Labs & Imaging studies reviewed. (See chart for  details)    10:00 PM  I reassessed patient bedside she is feeling much better after the Toradol her first cardiac enzyme within normal limits her D-dimer is negative again low concern for PE she is not hypoxic not tachypneic she looks more comfortable.  No evidence of pancreatitis her abdomen is soft nontender no evidence of rash pericarditis or myocarditis.  We discussed the plan for repeat troponin just to the chest comfort nature and the short onset of her symptoms when on her arrival.    11:32 PM  troponin normal x2 the pain has not recurred she feeling much better this could be esophageal spasm or gastritis strict return precautions    I verified that the patient was wearing a mask and I was wearing appropriate PPE every time I entered the room. The patient's mask was on the patient at all times during my encounter except for a brief view of the oropharynx.      The patient will return for new or worsening symptoms and is stable at the time of discharge.    The patient is referred to a primary physician for blood pressure management, diabetic screening, and for all other preventative health concerns.    DISPOSITION:  Patient will be discharged home in stable condition.    FOLLOW UP:  Lei Davenport M.D.  [address] [address]  [phone]    Schedule an appointment as soon as possible for a visit       [facility], Emergency Dept  [address] [id number] [phone]    If symptoms worsen      OUTPATIENT MEDICATIONS:  Discharge Medication List as of 4/12/2022 11:37 PM      ,    FINAL IMPRESSION  1. Left-sided chest pain             Electronically signed by: Germania Brown M.D., 4/12/2022 8:58 PM    This dictation has been created using voice recognition software and/or scribes. The accuracy of the dictation is limited by the abilities of the software and the expertise of the scribes. I expect there may be some errors of grammar and possibly content. I made  every attempt to manually correct the errors within my dictation. However, errors related to voice recognition software and/or scribes may still exist and should be interpreted within the appropriate context.

## 2022-04-13 NOTE — ED NOTES
Patient educated on discharge instructions, follow up appointments, prescriptions, and home care. Patient verbalized understanding. Patient ambulated to [facility].

## 2022-04-13 NOTE — ED NOTES
Pt presents to the ED complaining of chest pain.    Sudden onset.  Deep breaths makes pain worse; nothing makes pain better.  Pt describes pain as pressure, stabbing.  Radiates to to left shoulder and arm.  Pt rates pain at a 10 of 10.  Pain started shortly PTA.    Pt states dizziness occurs with pain.  SOB due to pain.

## 2022-04-13 NOTE — ED TRIAGE NOTES
"  Chief Complaint   Patient presents with   • Chest Pain          • Shortness of Breath     X 20 min     Pt to triage for above complaint. Pt reports sudden onset \"squeezing\" CP that radiates to upper back and SOB while riding in the car. Denies cardiac Hx, no provoking or alleviating factors. EKG obtained in triage.     Pt is alert/oriented, following commands, and answering questions appropriately.Obvious pain, pt clutching chest in triage and respirations are even and unlabored.   Pt placed in lobby and educated on triage process. Pt encouraged to alert staff for any changes in condition.    ./89   Pulse 86   Temp (!) 34.9 °C (94.8 °F) (Oral)   Resp (!) 28   Ht 1.448 m (4' 9\")   Wt 64.9 kg (143 lb)   LMP 04/20/2021   SpO2 100%   BMI 30.94 kg/m²     "

## 2022-04-22 ENCOUNTER — OFFICE VISIT (OUTPATIENT)
Dept: INTERNAL MEDICINE | Facility: OTHER | Age: 37
End: 2022-04-22
Payer: COMMERCIAL

## 2022-04-22 VITALS
HEART RATE: 73 BPM | HEIGHT: 58 IN | BODY MASS INDEX: 30.19 KG/M2 | WEIGHT: 143.8 LBS | SYSTOLIC BLOOD PRESSURE: 110 MMHG | DIASTOLIC BLOOD PRESSURE: 76 MMHG | TEMPERATURE: 97.8 F | OXYGEN SATURATION: 96 %

## 2022-04-22 DIAGNOSIS — E87.29 METABOLIC ACIDOSIS, INCREASED ANION GAP: ICD-10-CM

## 2022-04-22 DIAGNOSIS — E11.9 TYPE 2 DIABETES MELLITUS TREATED WITHOUT INSULIN (HCC): ICD-10-CM

## 2022-04-22 LAB — GLUCOSE BLD-MCNC: 159 MG/DL (ref 70–100)

## 2022-04-22 PROCEDURE — 82962 GLUCOSE BLOOD TEST: CPT | Performed by: STUDENT IN AN ORGANIZED HEALTH CARE EDUCATION/TRAINING PROGRAM

## 2022-04-22 PROCEDURE — 99213 OFFICE O/P EST LOW 20 MIN: CPT | Mod: GE | Performed by: STUDENT IN AN ORGANIZED HEALTH CARE EDUCATION/TRAINING PROGRAM

## 2022-04-22 ASSESSMENT — ENCOUNTER SYMPTOMS
DIAPHORESIS: 0
FEVER: 0
VOMITING: 0
SPUTUM PRODUCTION: 0
SENSORY CHANGE: 0
BACK PAIN: 0
FOCAL WEAKNESS: 0
WEIGHT LOSS: 1
DOUBLE VISION: 0
COUGH: 0
ORTHOPNEA: 0
BLOOD IN STOOL: 0
DEPRESSION: 0
TINGLING: 0
BRUISES/BLEEDS EASILY: 0
SORE THROAT: 0
TREMORS: 0
HEARTBURN: 0
NECK PAIN: 0
HEMOPTYSIS: 0
PHOTOPHOBIA: 0
HEADACHES: 0
ABDOMINAL PAIN: 0
DIZZINESS: 0
CHILLS: 0
NAUSEA: 0
PALPITATIONS: 0
NERVOUS/ANXIOUS: 0
MYALGIAS: 0
WEAKNESS: 0
SHORTNESS OF BREATH: 0
BLURRED VISION: 0

## 2022-04-22 ASSESSMENT — LIFESTYLE VARIABLES: SUBSTANCE_ABUSE: 0

## 2022-04-22 ASSESSMENT — FIBROSIS 4 INDEX: FIB4 SCORE: 0.4

## 2022-04-22 NOTE — PROGRESS NOTES
Teaching Physician Attestation      Level of Participation    I discussed with the resident physician the patient's history, exam, assessment and plan in detail.  Topics listed in my addendum were the focus of the visit.  Healthcare maintenance was not addressed this visit unless listed as a topic in my addendum.  I agree with plan as written along with the following additions/modifications:        DM2 in setting of obesity  -fasting POC glucose 150s, losing weight.  Cont metformin 1000 bid, , labs pending  -needs ppsv23  Rest dm checklist utd  -if a1c on repeat less than or close to 7.5% optimzed enough from DM standpoint to proceed with surgery if continues with diet/weight loss and meds      AGMA in  ED  -etio  Unclear, asymptomatic,  repeat bmp after hydration        Return to clinic within 2 months, or sooner if anion gap metabolic acidosis does not resolve.

## 2022-04-23 NOTE — PROGRESS NOTES
Established Patient    Patient Care Team:  Lei Davenport M.D. as PCP - General (Internal Medicine)    HPI:  Jyotsna Franks is a 36 y.o. female who presents today with the following Chief Complaint(s):   Chief Complaint   Patient presents with   • Follow-Up   • Nausea   • Dizziness     Mrs. Cox is here for follow up for recent diagnosis of DM.  In her last visit I sent her for additional lab work and also is here to monitor more recent glucose levels at home since she was started on therapy for DM.  Patient comes today stating that she has not been checking her BG as instructed and also did not get done lab work.  Patient does not have any new complaints, reports weight loss of about 8-10 lbs, she mentions that has been eating healthier foods.  She is supposed to follow up with Orthopaedic surgeon in the next couple of weeks but she stated to me that she has plans to travel to [facility] and she will delay her Ortho follow up.    Review of Systems   Review of Systems   Constitutional: Positive for weight loss. Negative for chills, diaphoresis, fever and malaise/fatigue.        Intentional weight loss.   HENT: Negative for congestion, nosebleeds, sore throat and tinnitus.    Eyes: Negative for blurred vision, double vision and photophobia.   Respiratory: Negative for cough, hemoptysis, sputum production and shortness of breath.    Cardiovascular: Negative for chest pain, palpitations, orthopnea and leg swelling.   Gastrointestinal: Negative for abdominal pain, blood in stool, heartburn, melena, nausea and vomiting.   Genitourinary: Negative for dysuria, frequency, hematuria and urgency.   Musculoskeletal: Positive for joint pain. Negative for back pain, myalgias and neck pain.        Left knee pain sec to injury   Skin: Negative for rash.   Neurological: Negative for dizziness, tingling, tremors, sensory change, focal weakness, weakness and headaches.   Endo/Heme/Allergies: Does not bruise/bleed  easily.   Psychiatric/Behavioral: Negative for depression, substance abuse and suicidal ideas. The patient is not nervous/anxious.          Past Medical History:   Diagnosis Date   • Dental disorder     broken left side tooth   • Gestational diabetes     Gestational only with last two pregnancies   • Hyperlipidemia        Patient Active Problem List    Diagnosis Date Noted   • New ACL tear, left, initial encounter 02/03/2022   • Mcl deficiency, knee, left 02/03/2022   • Postoperative visit 06/16/2021   • Thickened endometrium 02/10/2021   • Pelvic pain 02/10/2021   • Hypertriglyceridemia 11/02/2017   • Prediabetes 11/02/2017   • Viral URI with cough 11/02/2017   • Decreased visual acuity 05/02/2017   • Epigastric pain 03/21/2017   • Dyspareunia in female 03/21/2017   • History of gestational diabetes 09/11/2014       Allergies:Pcn [penicillins] and Other environmental    Current Outpatient Medications   Medication Sig Dispense Refill   • metformin (GLUCOPHAGE) 1000 MG tablet Take 1 Tablet by mouth 2 times a day with meals. 60 Tablet 3   • atorvastatin (LIPITOR) 20 MG Tab Take 1 Tablet by mouth every evening. 30 Tablet 3   • acetaminophen (TYLENOL) 500 MG Tab Take 500-1,000 mg by mouth every 6 hours as needed.     • Cetirizine HCl (ZYRTEC ALLERGY PO) Take  by mouth every day.       No current facility-administered medications for this visit.       Social History     Tobacco Use   • Smoking status: Never Smoker   • Smokeless tobacco: Never Used   Vaping Use   • Vaping Use: Never used   Substance Use Topics   • Alcohol use: Not Currently     Comment: 1 PER MONTH   • Drug use: No       Family History   Problem Relation Age of Onset   • Diabetes Mother    • Heart Disease Maternal Grandmother    • Cancer Maternal Grandmother         Throat Cancer   • Diabetes Sister    • Pancreatic Cancer Sister          Physical Exam  Vitals:  /76 (BP Location: Right arm, Patient Position: Sitting, BP Cuff Size: Adult)   Pulse 73   " Temp 36.6 °C (97.8 °F) (Temporal)   Ht 1.461 m (4' 9.52\")   Wt 65.2 kg (143 lb 12.8 oz)   SpO2 96%  Body mass index is 30.56 kg/m².     Constitutional:  Not in acute distress, well appearing.  HEENT:   NC/AT, PERRLA  Cardiovascular: Regular rate and rhythm. No murmurs or gallops.      Lungs:   Clear to auscultation bilaterally. No wheezes or crackles. No respiratory distress.  Abdomen: Not distended, soft, not tender. No guarding or rigidity. No masses.  Extremities:  No cyanosis/clubbing/edema. No obvious deformities.  Left knee pain improving, not using knee immobilizer any longer, walks with a limp but not using walking devices. ROM left knee severely reduced.   Skin:  Warm and dry.  No visible rashes.  Neurologic: Alert & oriented x 3, CN II-XII grossly intact, strength and sensation grossly intact.  No focal deficits noted.  Psychiatric:  Affect normal, mood normal, judgment normal.      Assessment and Plan:     Diabetes Mellitus  - Recent diagnosis last month after undergoing evaluation for knee surgery  - A1c 8.2% in March 16th   - Patient was started on Metformin 1 gm BID and Stain after diagnosis  - No significant side effects  - Patient has not been checking her BG as instructed  - Office BG was 157 finger stick, which represents much better equivalent to her A1c.  - I told patient that she can have ligament repair if she continues her therapy and diet        PLAN:  - Continue therapy (metformin, statin)  - Get A1c in 2 months  - Follow up after results  - OK to have surgery     Obesity  - Improving  - She reports eating healthier  - 10 lbs weight loss  - CTM      Return in about 2 months (around 6/22/2022).        Please note that this dictation was created using voice recognition software. I have made every reasonable attempt to correct obvious errors, but I expect that there are errors of grammar and possibly content that I did not discover before finalizing the note.    Total face-to-face time " spent in medical decision making:    Dr. Issac M.D. PGY-2  [facility]

## 2022-04-23 NOTE — PATIENT INSTRUCTIONS
- Continue metformin and statin  - Monitor fasting BG few times a week  - Continue strict diabetic diet and weight loss  - OK to get Knee surgery  - Get labs in 2 months  - Follow up after labs available

## 2022-06-03 ENCOUNTER — HOSPITAL ENCOUNTER (OUTPATIENT)
Dept: LAB | Facility: MEDICAL CENTER | Age: 37
End: 2022-06-03
Attending: STUDENT IN AN ORGANIZED HEALTH CARE EDUCATION/TRAINING PROGRAM
Payer: COMMERCIAL

## 2022-06-03 DIAGNOSIS — E13.9 DIABETES 1.5, MANAGED AS TYPE 2 (HCC): ICD-10-CM

## 2022-06-03 DIAGNOSIS — R68.82 LOW LIBIDO: ICD-10-CM

## 2022-06-03 LAB
ANION GAP SERPL CALC-SCNC: 15 MMOL/L (ref 7–16)
BUN SERPL-MCNC: 10 MG/DL (ref 8–22)
CALCIUM SERPL-MCNC: 9.5 MG/DL (ref 8.4–10.2)
CHLORIDE SERPL-SCNC: 101 MMOL/L (ref 96–112)
CHOLEST SERPL-MCNC: 180 MG/DL (ref 100–199)
CO2 SERPL-SCNC: 22 MMOL/L (ref 20–33)
CREAT SERPL-MCNC: 0.37 MG/DL (ref 0.5–1.4)
CREAT UR-MCNC: 230.44 MG/DL
ESTRADIOL SERPL-MCNC: 165 PG/ML
FASTING STATUS PATIENT QL REPORTED: NORMAL
FSH SERPL-ACNC: 3.4 MIU/ML
GFR SERPLBLD CREATININE-BSD FMLA CKD-EPI: 132 ML/MIN/1.73 M 2
GLUCOSE SERPL-MCNC: 195 MG/DL (ref 65–99)
HDLC SERPL-MCNC: 44 MG/DL
LDLC SERPL CALC-MCNC: 70 MG/DL
LH SERPL-ACNC: 6.3 IU/L
MICROALBUMIN UR-MCNC: 5.1 MG/DL
MICROALBUMIN/CREAT UR: 22 MG/G (ref 0–30)
POTASSIUM SERPL-SCNC: 3.9 MMOL/L (ref 3.6–5.5)
SODIUM SERPL-SCNC: 138 MMOL/L (ref 135–145)
TRIGL SERPL-MCNC: 329 MG/DL (ref 0–149)

## 2022-06-03 PROCEDURE — 36415 COLL VENOUS BLD VENIPUNCTURE: CPT

## 2022-06-03 PROCEDURE — 82670 ASSAY OF TOTAL ESTRADIOL: CPT

## 2022-06-03 PROCEDURE — 83002 ASSAY OF GONADOTROPIN (LH): CPT

## 2022-06-03 PROCEDURE — 80061 LIPID PANEL: CPT

## 2022-06-03 PROCEDURE — 80048 BASIC METABOLIC PNL TOTAL CA: CPT

## 2022-06-03 PROCEDURE — 82570 ASSAY OF URINE CREATININE: CPT

## 2022-06-03 PROCEDURE — 82043 UR ALBUMIN QUANTITATIVE: CPT

## 2022-06-03 PROCEDURE — 83001 ASSAY OF GONADOTROPIN (FSH): CPT

## 2022-06-10 ENCOUNTER — OFFICE VISIT (OUTPATIENT)
Dept: INTERNAL MEDICINE | Facility: OTHER | Age: 37
End: 2022-06-10
Payer: COMMERCIAL

## 2022-06-10 VITALS
SYSTOLIC BLOOD PRESSURE: 109 MMHG | HEIGHT: 58 IN | HEART RATE: 84 BPM | DIASTOLIC BLOOD PRESSURE: 74 MMHG | WEIGHT: 144 LBS | OXYGEN SATURATION: 99 % | TEMPERATURE: 98.2 F | BODY MASS INDEX: 30.23 KG/M2

## 2022-06-10 DIAGNOSIS — E11.9 TYPE 2 DIABETES MELLITUS TREATED WITHOUT INSULIN (HCC): ICD-10-CM

## 2022-06-10 PROBLEM — J06.9 VIRAL URI WITH COUGH: Status: RESOLVED | Noted: 2017-11-02 | Resolved: 2022-06-10

## 2022-06-10 PROBLEM — Z48.89 POSTOPERATIVE VISIT: Status: RESOLVED | Noted: 2021-06-16 | Resolved: 2022-06-10

## 2022-06-10 PROCEDURE — 99213 OFFICE O/P EST LOW 20 MIN: CPT | Mod: GE | Performed by: STUDENT IN AN ORGANIZED HEALTH CARE EDUCATION/TRAINING PROGRAM

## 2022-06-10 ASSESSMENT — ENCOUNTER SYMPTOMS
ABDOMINAL PAIN: 0
SORE THROAT: 0
BLURRED VISION: 0
NECK PAIN: 0
SPUTUM PRODUCTION: 0
DIZZINESS: 0
ORTHOPNEA: 0
PALPITATIONS: 0
HALLUCINATIONS: 0
HEMOPTYSIS: 0
VOMITING: 0
SPEECH CHANGE: 0
CHILLS: 0
SENSORY CHANGE: 0
BLOOD IN STOOL: 0
DEPRESSION: 0
HEADACHES: 0
TREMORS: 0
NERVOUS/ANXIOUS: 0
WEIGHT LOSS: 0
NAUSEA: 0
BACK PAIN: 0
BRUISES/BLEEDS EASILY: 0
FEVER: 0
PHOTOPHOBIA: 0
COUGH: 0
FOCAL WEAKNESS: 0
TINGLING: 0
EYE PAIN: 0
WEAKNESS: 0
SHORTNESS OF BREATH: 0
HEARTBURN: 0
DOUBLE VISION: 0
MYALGIAS: 0

## 2022-06-10 ASSESSMENT — LIFESTYLE VARIABLES: SUBSTANCE_ABUSE: 0

## 2022-06-10 ASSESSMENT — FIBROSIS 4 INDEX: FIB4 SCORE: 0.4

## 2022-06-10 NOTE — PROGRESS NOTES
Established Patient    Patient Care Team:  Lei Davenport M.D. as PCP - General (Internal Medicine)    HPI:  Jyotsna Franks is a 36 y.o. female who presents today with the following Chief Complaint(s):   Chief Complaint   Patient presents with   • Lab Results   • Hair Loss     X  2 weeks and it been falling a lot    • Follow-Up     When she takes a nap she wakes up she feels sick, and she checks her glucose at its at 220     Mrs. Cox is here for follow up for DM and new lab work results.  She has a recent diagnosis of DM type 2. Remote Hx of gestagional diabetes. She is currently on Metformin 1 gr BID.  AM BG at home recent weeks all over the place, sometimes 140 but also getting readings in high 190's and even low 200's. This morning at home 195, also recent CMP fasting BG was 192.  Patient also stated feeling thirsty most days in recent weeks.  A1c in March 16 was 8.2 prior Therapy with Metformin. She will get an A1c next week since it has been 90 days since previous test.  Other labs elevated triglycerides 329,  normal LH and FSH and Negative for proteinuria.    Review of Systems   Review of Systems   Constitutional: Negative for chills, fever, malaise/fatigue and weight loss.   HENT: Negative for congestion, ear discharge, nosebleeds, sore throat and tinnitus.    Eyes: Negative for blurred vision, double vision, photophobia and pain.   Respiratory: Negative for cough, hemoptysis, sputum production and shortness of breath.    Cardiovascular: Negative for chest pain, palpitations, orthopnea and leg swelling.   Gastrointestinal: Negative for abdominal pain, blood in stool, heartburn, melena, nausea and vomiting.   Genitourinary: Negative for dysuria, frequency, hematuria and urgency.   Musculoskeletal: Negative for back pain, joint pain, myalgias and neck pain.   Skin: Negative for rash.   Neurological: Negative for dizziness, tingling, tremors, sensory change, speech change, focal  weakness, weakness and headaches.   Endo/Heme/Allergies: Does not bruise/bleed easily.   Psychiatric/Behavioral: Negative for depression, hallucinations, substance abuse and suicidal ideas. The patient is not nervous/anxious.          Past Medical History:   Diagnosis Date   • Dental disorder     broken left side tooth   • Gestational diabetes     Gestational only with last two pregnancies   • Hyperlipidemia        Patient Active Problem List    Diagnosis Date Noted   • Type 2 diabetes mellitus treated without insulin ([facility]) 06/10/2022   • New ACL tear, left, initial encounter 02/03/2022   • Mcl deficiency, knee, left 02/03/2022   • Thickened endometrium 02/10/2021   • Pelvic pain 02/10/2021   • Hypertriglyceridemia 11/02/2017   • Decreased visual acuity 05/02/2017   • Epigastric pain 03/21/2017   • Dyspareunia in female 03/21/2017   • History of gestational diabetes 09/11/2014       Allergies:Pcn [penicillins] and Other environmental    Current Outpatient Medications   Medication Sig Dispense Refill   • Empagliflozin 10 MG Tab Take 1 Tablet by mouth every day. 30 Tablet 3   • metformin (GLUCOPHAGE) 1000 MG tablet Take 1 Tablet by mouth 2 times a day with meals. 60 Tablet 3   • atorvastatin (LIPITOR) 20 MG Tab Take 1 Tablet by mouth every evening. 30 Tablet 3   • Cetirizine HCl (ZYRTEC ALLERGY PO) Take  by mouth every day.       No current facility-administered medications for this visit.       Social History     Tobacco Use   • Smoking status: Never Smoker   • Smokeless tobacco: Never Used   Vaping Use   • Vaping Use: Never used   Substance Use Topics   • Alcohol use: Not Currently     Comment: 1 PER MONTH   • Drug use: No       Family History   Problem Relation Age of Onset   • Diabetes Mother    • Heart Disease Maternal Grandmother    • Cancer Maternal Grandmother         Throat Cancer   • Diabetes Sister    • Pancreatic Cancer Sister          Physical Exam  Vitals:  /74 (BP Location: Left arm, Patient  "Position: Sitting, BP Cuff Size: Adult)   Pulse 84   Temp 36.8 °C (98.2 °F) (Temporal)   Ht 1.461 m (4' 9.52\")   Wt 65.3 kg (144 lb)   SpO2 99%  Body mass index is 30.6 kg/m².     Constitutional:  Not in acute distress, well appearing.  HEENT:   NC/AT, PERRLA.  Cardiovascular: Regular rate and rhythm. No murmurs or gallops.      Lungs:   Clear to auscultation bilaterally. No wheezes or crackles. No respiratory distress.  Abdomen: Not distended, soft, not tender. No guarding or rigidity. No masses.  Extremities:  No cyanosis/clubbing/edema. No obvious deformities.  Skin:  Warm and dry.  No visible rashes.  Neurologic: Alert & oriented x 3, CN II-XII grossly intact, strength and sensation grossly intact.  No focal deficits noted.  Psychiatric:  Affect normal, mood normal, judgment normal.      Assessment and Plan:     DM type 2  - Diagnosis in March  - Incidentally finding while work up for surgery after sled accident   - Remote Hx of Gestational diabetes  - On metformin 1 gr BID  - Recent CMP B  - Today Fasting BG at home 195  - Patient stated getting BG readings 140-90 range but also low 200 rarely.  - Has been feeling thirsty lately  - A1c: 8.2% on .         PLAN:  - Continue metformin  - Start Jardiance 10 mg daily  - Referral to Dietician and Ophthalmology  - Weight loss, regular exercise  - Keep monitoring Fasting Glucose few day a week   - A1c next week  - We will repeat A1c in 3 months in next visit      Return in about 4 months (around 10/10/2022).        Please note that this dictation was created using voice recognition software. I have made every reasonable attempt to correct obvious errors, but I expect that there are errors of grammar and possibly content that I did not discover before finalizing the note.    Total face-to-face time spent in medical decision making:    Dr. Issac M.D. PGY-2  [facility] [employer]    "

## 2022-06-10 NOTE — PATIENT INSTRUCTIONS
- Start Jardiance 10 mg daily  - Continue Metformin twice a day  - Diabetes education referral  - Weight loss, regular exercise  - A1c next week  - Follow up in Early October  - We will repeat A1c office visit

## 2022-08-24 ENCOUNTER — OFFICE VISIT (OUTPATIENT)
Dept: INTERNAL MEDICINE | Facility: OTHER | Age: 37
End: 2022-08-24
Payer: COMMERCIAL

## 2022-08-24 VITALS
HEIGHT: 56 IN | BODY MASS INDEX: 31.27 KG/M2 | OXYGEN SATURATION: 97 % | HEART RATE: 85 BPM | TEMPERATURE: 97.8 F | SYSTOLIC BLOOD PRESSURE: 113 MMHG | WEIGHT: 139 LBS | DIASTOLIC BLOOD PRESSURE: 69 MMHG

## 2022-08-24 DIAGNOSIS — E11.9 TYPE 2 DIABETES MELLITUS TREATED WITHOUT INSULIN (HCC): ICD-10-CM

## 2022-08-24 PROBLEM — R10.13 EPIGASTRIC PAIN: Status: RESOLVED | Noted: 2017-03-21 | Resolved: 2022-08-24

## 2022-08-24 LAB
HBA1C MFR BLD: 6.9 % (ref 0–5.6)
INT CON NEG: ABNORMAL
INT CON POS: ABNORMAL

## 2022-08-24 PROCEDURE — 83036 HEMOGLOBIN GLYCOSYLATED A1C: CPT | Performed by: STUDENT IN AN ORGANIZED HEALTH CARE EDUCATION/TRAINING PROGRAM

## 2022-08-24 PROCEDURE — 99213 OFFICE O/P EST LOW 20 MIN: CPT | Mod: GE | Performed by: STUDENT IN AN ORGANIZED HEALTH CARE EDUCATION/TRAINING PROGRAM

## 2022-08-24 ASSESSMENT — ENCOUNTER SYMPTOMS
PHOTOPHOBIA: 0
HEADACHES: 0
SINUS PAIN: 0
WEIGHT LOSS: 0
NERVOUS/ANXIOUS: 0
SORE THROAT: 0
TREMORS: 0
NAUSEA: 0
DIZZINESS: 0
TINGLING: 0
FOCAL WEAKNESS: 0
SPEECH CHANGE: 0
SPUTUM PRODUCTION: 0
SHORTNESS OF BREATH: 0
WEAKNESS: 0
HEMOPTYSIS: 0
DOUBLE VISION: 0
FEVER: 0
BACK PAIN: 0
CHILLS: 0
COUGH: 0
HALLUCINATIONS: 0
VOMITING: 0
MYALGIAS: 0
ABDOMINAL PAIN: 0
ORTHOPNEA: 0
HEARTBURN: 0
DEPRESSION: 0
BLURRED VISION: 0
PALPITATIONS: 0
NECK PAIN: 0
SENSORY CHANGE: 0
BRUISES/BLEEDS EASILY: 0
BLOOD IN STOOL: 0

## 2022-08-24 ASSESSMENT — FIBROSIS 4 INDEX: FIB4 SCORE: 0.41

## 2022-08-24 ASSESSMENT — LIFESTYLE VARIABLES: SUBSTANCE_ABUSE: 0

## 2022-08-24 NOTE — PATIENT INSTRUCTIONS
- Continuo metformin and Jardiance  - Strict diabetic diet  - Referral to Dietician  - Regular exercise  - Follow up with Ophthalmology  - Follow up with me in 6 months

## 2022-08-24 NOTE — PROGRESS NOTES
Established Patient    Patient Care Team:  Lei Davenport M.D. as PCP - General (Internal Medicine)    HPI:  Jyotsna Franks is a 37 y.o. female who presents today with the following Chief Complaint(s):   Chief Complaint   Patient presents with    Follow-Up     diabetes     Jyotsna is here early for follow up. Last visit I told her to come back till October.  Apparently there was a confusion with scheduling they were trying to scheduled her to see Dietician but she was scheduled with me. Since Dietician can not see her today I will check A1c a bit earlier than I was hoping for and cancel October appointment.  Patient states feeling well, just a bit reduced appetite, but otherwise doing fine, her fasting glucose at home have been in the 110-130 range most of the time and rarely above 160.  Today's A1c at office: 6.9 improved from 8.2 at time of diagnosis in March.  In June we added Jardiance 10 mg daily, currently also taking metformin 1 gr BID.      Review of Systems   Review of Systems   Constitutional:  Negative for chills, fever, malaise/fatigue and weight loss.   HENT:  Negative for congestion, ear discharge, sinus pain, sore throat and tinnitus.    Eyes:  Negative for blurred vision, double vision and photophobia.   Respiratory:  Negative for cough, hemoptysis, sputum production and shortness of breath.    Cardiovascular:  Negative for chest pain, palpitations, orthopnea and leg swelling.   Gastrointestinal:  Negative for abdominal pain, blood in stool, heartburn, melena, nausea and vomiting.   Genitourinary:  Negative for dysuria, frequency, hematuria and urgency.   Musculoskeletal:  Negative for back pain, joint pain, myalgias and neck pain.   Skin:  Negative for rash.   Neurological:  Negative for dizziness, tingling, tremors, sensory change, speech change, focal weakness, weakness and headaches.   Endo/Heme/Allergies:  Positive for environmental allergies. Does not bruise/bleed easily.  "  Psychiatric/Behavioral:  Negative for depression, hallucinations, substance abuse and suicidal ideas. The patient is not nervous/anxious.        Past Medical History:   Diagnosis Date    Dental disorder     broken left side tooth    Gestational diabetes     Gestational only with last two pregnancies    Hyperlipidemia        Patient Active Problem List    Diagnosis Date Noted    Type 2 diabetes mellitus treated without insulin ([facility]) 06/10/2022    New ACL tear, left, initial encounter 02/03/2022    Mcl deficiency, knee, left 02/03/2022    Thickened endometrium 02/10/2021    Pelvic pain 02/10/2021    Hypertriglyceridemia 11/02/2017    Decreased visual acuity 05/02/2017    Epigastric pain 03/21/2017    Dyspareunia in female 03/21/2017    History of gestational diabetes 09/11/2014       Allergies:Pcn [penicillins] and Other environmental    Current Outpatient Medications   Medication Sig Dispense Refill    Empagliflozin 10 MG Tab Take 1 Tablet by mouth every day. 30 Tablet 3    metformin (GLUCOPHAGE) 1000 MG tablet Take 1 Tablet by mouth 2 times a day with meals. 60 Tablet 3    atorvastatin (LIPITOR) 20 MG Tab Take 1 Tablet by mouth every evening. 30 Tablet 3    Cetirizine HCl (ZYRTEC ALLERGY PO) Take  by mouth every day.       No current facility-administered medications for this visit.       Social History     Tobacco Use    Smoking status: Never    Smokeless tobacco: Never   Vaping Use    Vaping Use: Never used   Substance Use Topics    Alcohol use: Not Currently     Comment: 1 PER MONTH    Drug use: No       Family History   Problem Relation Age of Onset    Diabetes Mother     Heart Disease Maternal Grandmother     Cancer Maternal Grandmother         Throat Cancer    Diabetes Sister     Pancreatic Cancer Sister          Physical Exam  Vitals:  /69 (BP Location: Right arm, Patient Position: Sitting, BP Cuff Size: Adult)   Pulse 85   Temp 36.6 °C (97.8 °F) (Temporal)   Ht 1.422 m (4' 8\")   Wt 63 kg (139 lb) "   SpO2 97%  Body mass index is 31.16 kg/m².    Constitutional:  Not in acute distress, well appearing.  HEENT:   NC/AT, PERRLA, EOMN.  Cardiovascular: Regular rate and rhythm. No murmurs or gallops.      Lungs:   Clear to auscultation bilaterally. No wheezes or crackles. No respiratory distress.  Abdomen: Not distended, soft, not tender. No guarding or rigidity. No masses.  Extremities:  No cyanosis/clubbing/edema. No obvious deformities.  Skin:  Warm and dry.  No visible rashes.  Neurologic: Alert & oriented x 3, CN II-XII grossly intact, strength and sensation grossly intact.  No focal deficits noted.  Psychiatric:  Affect normal, mood normal, judgment normal.      Assessment and Plan:     Diabetes type 2  - Currently on Metformin 1 gr BID and Jardiance 10 mg daily (started in June)  - Fasting -130 range most readings  - In house A1c today: 6.9%  - She is eating healthier and also incorporating exercise as injury (knee) allows  - I told patient that there is not need to monitor fasting glucose any longer  - We will repeat A1c in 6 months        Plan:  - Continue Jardiance and Metformin   - Repeat A1c in 5-6 months  - Follow up after results  - Referral to Dietician  - Schedule appointment with Ophthalmo  - Continue regular exercise, weight loss, strict low carb diet          Return in about 6 months (around 2/24/2023).        Please note that this dictation was created using voice recognition software. I have made every reasonable attempt to correct obvious errors, but I expect that there are errors of grammar and possibly content that I did not discover before finalizing the note.    Total face-to-face time spent in medical decision making:    Dr. Issac M.D. PGY-3  [facility]

## 2022-08-25 ENCOUNTER — NON-PROVIDER VISIT (OUTPATIENT)
Dept: INTERNAL MEDICINE | Facility: OTHER | Age: 37
End: 2022-08-25
Payer: COMMERCIAL

## 2022-08-25 VITALS — WEIGHT: 139 LBS | BODY MASS INDEX: 31.16 KG/M2

## 2022-08-25 DIAGNOSIS — E78.1 HYPERTRIGLYCERIDEMIA: ICD-10-CM

## 2022-08-25 DIAGNOSIS — E66.09 CLASS 1 OBESITY DUE TO EXCESS CALORIES WITH SERIOUS COMORBIDITY AND BODY MASS INDEX (BMI) OF 30.0 TO 30.9 IN ADULT: ICD-10-CM

## 2022-08-25 DIAGNOSIS — E11.9 TYPE 2 DIABETES MELLITUS TREATED WITHOUT INSULIN (HCC): ICD-10-CM

## 2022-08-25 PROCEDURE — 97802 MEDICAL NUTRITION INDIV IN: CPT | Performed by: DIETITIAN, REGISTERED

## 2022-08-25 ASSESSMENT — FIBROSIS 4 INDEX: FIB4 SCORE: 0.41

## 2022-08-25 NOTE — PROGRESS NOTES
Jyotsna is a 37 y.o. female here for nutrition assessment for Type 2 Diabetes    Recent A1c at 6.9% which was yesterday, down from 8.2% in march 2022  Currently on Jardiance, metformin   She did have GDM - 7 years ago and 12 years ago (2 of her 4 pregnancies)     Found that she was doing a good job with making good food choices but then the pandemic occurred and her dad was dying of cancer and found she did a lot of emotional eating and feels that contributed to her blood sugars     She has been making changes to her diet with limited soda, chips, candy, bread but finds it hard to limit since she likes them     Lives with: 4 kids (17,14, 12,7) and    She does shopping and cooking  Trying not to buy cookies/sweets  Stopped buying sodas/juices  Trying to buy whole wheat bread or sometimes tortillas homemade from her family    24 hour recall:  B - quesadillia with flour tortilla, ham, cheese, coffee  L - beans, eggs, 1 tortillas, water   D - sandwich with ham and ratliff, water    Beverages: water, coffee, tea,     Exercise: knee pain so limited with exercise/running/squats  Tries to walk as much as she can tolerate 20-30 minutes and tries to get in 3 days/week   She has been told she needs to have knee surgery but cannot have it until her A1c gets lower which it is  now     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 Also discussed some options to support reducing her fat/calorie intake with condiments and encouraged her to do as much walking as she can and is allowed to until she has knee surgery. Set goals; rtc with RD in 1 month    Time spent: 60 minutes

## 2022-08-25 NOTE — PATIENT INSTRUCTIONS
Goals:  Start counting your carbohydrates. Aim for 30-45 grams of total carbs per MEAL and make sure to include lean proteins (low fat meats) and non-starchy veggies. Use the handouts we reviewed today   some Aneumed salad dressings (you took a picture) to have with salads or use a dip  Until surgery, do as much walking as you can. Try to get in 150 minutes per week    The patient is a 50y Female complaining of back pain general.

## 2022-08-30 NOTE — TELEPHONE ENCOUNTER
Last seen: 8.24.22 by Dr. Davenport  Next appt: None    Was the patient seen in the last year in this department? Yes   Does patient have an active prescription for medications requested? No   Received Request Via: Pharmacy

## 2022-08-31 RX ORDER — ATORVASTATIN CALCIUM 20 MG/1
20 TABLET, FILM COATED ORAL NIGHTLY
Qty: 30 TABLET | Refills: 3 | Status: SHIPPED | OUTPATIENT
Start: 2022-08-31 | End: 2023-01-18 | Stop reason: SDUPTHER

## 2022-09-07 ENCOUNTER — GYNECOLOGY VISIT (OUTPATIENT)
Dept: OBGYN | Facility: CLINIC | Age: 37
End: 2022-09-07
Payer: COMMERCIAL

## 2022-09-07 ENCOUNTER — HOSPITAL ENCOUNTER (OUTPATIENT)
Facility: MEDICAL CENTER | Age: 37
End: 2022-09-07
Attending: OBSTETRICS & GYNECOLOGY
Payer: COMMERCIAL

## 2022-09-07 VITALS — DIASTOLIC BLOOD PRESSURE: 71 MMHG | BODY MASS INDEX: 31.16 KG/M2 | SYSTOLIC BLOOD PRESSURE: 114 MMHG | WEIGHT: 139 LBS

## 2022-09-07 DIAGNOSIS — N94.10 DYSPAREUNIA IN FEMALE: ICD-10-CM

## 2022-09-07 PROCEDURE — 87660 TRICHOMONAS VAGIN DIR PROBE: CPT

## 2022-09-07 PROCEDURE — 87480 CANDIDA DNA DIR PROBE: CPT

## 2022-09-07 PROCEDURE — 87491 CHLMYD TRACH DNA AMP PROBE: CPT

## 2022-09-07 PROCEDURE — 87510 GARDNER VAG DNA DIR PROBE: CPT

## 2022-09-07 PROCEDURE — 87591 N.GONORRHOEAE DNA AMP PROB: CPT

## 2022-09-07 PROCEDURE — 99213 OFFICE O/P EST LOW 20 MIN: CPT | Performed by: OBSTETRICS & GYNECOLOGY

## 2022-09-07 ASSESSMENT — FIBROSIS 4 INDEX: FIB4 SCORE: 0.41

## 2022-09-07 NOTE — PROGRESS NOTES
Pt here today for GYN appt.  Phone # [phone] (home)   C/o vaginal dryness, px w/ intercourse, low libido

## 2022-09-07 NOTE — PROGRESS NOTES
Chief Complaint   Patient presents with    Gynecologic Exam       History of present illness: 37 y.o. presents with abdominal pain, pain with intercourse, and vaginal dryness.  She states this has been going on for about 3 to 4 months.  Patient had a hysterectomy on 2021 for bleeding and for pain.  She states that although she did have pain with intercourse and similar abdominal pain prior to her hysterectomy, this is similar but also slightly different.  She states the discomfort and pain will occur definitely when she has more vigorous sex, or has exercised but sometimes can occur with mild exercise and activity.  She does state that sometimes when her partner is more gentle, there is not much pain but there is usually discomfort afterwards.  She does also sometimes spot lightly after intercourse.  Sometimes she also complains of a slight ripping sensation and discomfort in the left lower quadrant.  Mostly, the pain as above is described as a stabbing which will occur for 2 to 3 hours at a time.    Review of systems:  Pertinent positives documented in HPI and all other systems reviewed & are negative    Constitutional: positive for hot flashes  Respiratory: negative  Cardiovascular: negative  Gastrointestinal: positive for abdominal pain and some times constipation when has abdominal pain but not consistent  Genitourinary:positive for hot flashes, sexual problems, and dryness  Integument/breast: negative  Hematologic/lymphatic: negative  Musculoskeletal:negative  Neurological: negative  Behavioral/Psych: positive for fatigue, irritability, and sexual difficulty  Endocrine: positive for diabetes    PGYNHx:   Hysterectomy with BS and AP repair in 2021    POBHx:   OB History    Para Term  AB Living   6 5 5   1 5   SAB IAB Ectopic Molar Multiple Live Births     1       5      # Outcome Date GA Lbr Mariano/2nd Weight Sex Delivery Anes PTL Lv   6 Term      Vag-Spont   SHELTON   5 Term     M Vag-Spont    SHELTON   4 Term     M Vag-Spont   SE[place]TON   3 Term     M Vag-Spont   SHELTON   2 Term     M Vag-Spont   SHELTON   1 IAB                All PMH, PSH, allergies, social history and FH reviewed and updated today:  Past Medical History:   Diagnosis Date    Dental disorder     broken left side tooth    Gestational diabetes     Gestational only with last two pregnancies    Hyperlipidemia        Past Surgical History:   Procedure Laterality Date    HYSTERECTOMY LAPAROSCOPY  6/2/2021    Procedure: HYSTERECTOMY, LAPAROSCOPIC-TOTAL;  Surgeon: Juan F Hanks M.D.;  Location: SURGERY SAME DAY [facility];  Service: Obstetrics    SALPINGECTOMY Bilateral 6/2/2021    Procedure: SALPINGECTOMY;  Surgeon: Juan F Hanks M.D.;  Location: SURGERY SAME DAY [facility];  Service: Obstetrics    CYSTOSCOPY N/A 6/2/2021    Procedure: CYSTOSCOPY;  Surgeon: Juan F Hanks M.D.;  Location: SURGERY SAME DAY [facility];  Service: Obstetrics    ANTERIOR AND POSTERIOR REPAIR N/A 6/2/2021    Procedure: MCCALLS CULDOPLASTY;  Surgeon: Juan F Hanks M.D.;  Location: SURGERY SAME DAY [facility];  Service: Obstetrics       Allergies:   Allergies   Allergen Reactions    Pcn [Penicillins] Anaphylaxis    Other Environmental      pollens       Social History     Socioeconomic History    Marital status:      Spouse name: Not on file    Number of children: Not on file    Years of education: Not on file    Highest education level: 6th grade   Occupational History    Not on file   Tobacco Use    Smoking status: Never    Smokeless tobacco: Never   Vaping Use    Vaping Use: Never used   Substance and Sexual Activity    Alcohol use: Not Currently     Comment: 1 PER MONTH    Drug use: No    Sexual activity: Yes     Partners: Male     Birth control/protection: None   Other Topics Concern    Not on file   Social History Narrative    Not on file     Social Determinants of Health     Financial Resource Strain: Low Risk     Difficulty of Paying Living Expenses: Not  very hard   Food Insecurity: No Food Insecurity    Worried About Running Out of Food in the Last Year: Never true    Ran Out of Food in the Last Year: Never true   Transportation Needs: No Transportation Needs    Lack of Transportation (Medical): No    Lack of Transportation (Non-Medical): No   Physical Activity: Insufficiently Active    Days of Exercise per Week: 1 day    Minutes of Exercise per Session: 30 min   Stress: No Stress Concern Present    Feeling of Stress : Only a little   Social Connections: Moderately Integrated    Frequency of Communication with Friends and Family: More than three times a week    Frequency of Social Gatherings with Friends and Family: Twice a week    Attends Evangelical Services: More than 4 times per year    Active Member of Clubs or Organizations: No    Attends Club or Organization Meetings: Never    Marital Status:    Intimate Partner Violence: Not on file   Housing Stability: Low Risk     Unable to Pay for Housing in the Last Year: No    Number of Places Lived in the Last Year: 1    Unstable Housing in the Last Year: No       Family History   Problem Relation Age of Onset    Diabetes Mother     Heart Disease Maternal Grandmother     Cancer Maternal Grandmother         Throat Cancer    Diabetes Sister     Pancreatic Cancer Sister        Physical exam:  /71 (BP Location: Right arm, Patient Position: Sitting)   Wt 63 kg (139 lb)     General:appears stated age, is in no apparent distress  Head: normocephalic, non-tender  Neck: neck is supple, no jugular venous distension  CV : regular rate and rhythm, no peripheral edema  Lungs: Normal respiratory effort. Clear to auscultation bilaterally  Abdomen: Bowel sounds positive, nondistended, soft, nontender x4, no rebound or guarding. No organomegaly. No masses.  Female GYN:   normal female external genitalia without lesions  Vaginal cuff appears well-healed.  There is some component of sidewall prolapse as well as anterior  prolapse.  On bimanual examination, there is mild to moderate pain with palpation in the left lower quadrant however more pain with palpation towards the right midline and right lower quadrant.  Skin: No rashes, or ulcers or lesions seen  Psychiatric: Patient shows appropriate affect, is alert and oriented x3, intact judgment and insight.      Assessment  1. Dyspareunia in female  VAGINAL PATHOGENS DNA PANEL    Chlamydia/GC, PCR (Urine)    US-PELVIC COMPLETE (TRANSABDOMINAL/TRANSVAGINAL) (COMBO)          Plan  - 37 y.o.  here with dyspareunia  -We discussed the female libido and sexual arousal system is multicomponent including mental, physical, and emotional.  Advised that if she is concerned about the decreased libido, she could consider Addyi which I do not prescribe however she may refer to the website to investigate whether she is a candidate.  Discussed this medication is daily and can be associated with some medical side effects.  Offered patient prescription of estradiol cream.  Discussed that although this does not increase libido, it can help with her vaginal dryness.  Patient wishes to try this medication and thus a prescription was sent via fax to [facility] pharmacy.  Estradiol cream 0.2mg/g to be inserted every night at bedtime for 1 week and then decrease to 2-3 times per week.    Transvaginal ultrasound today to rule out structural pathology.    - Follow up with surgeon

## 2022-09-08 DIAGNOSIS — N94.10 DYSPAREUNIA IN FEMALE: ICD-10-CM

## 2022-09-08 LAB
C TRACH DNA GENITAL QL NAA+PROBE: NEGATIVE
CANDIDA DNA VAG QL PROBE+SIG AMP: POSITIVE
G VAGINALIS DNA VAG QL PROBE+SIG AMP: NEGATIVE
N GONORRHOEA DNA GENITAL QL NAA+PROBE: NEGATIVE
SPECIMEN SOURCE: NORMAL
T VAGINALIS DNA VAG QL PROBE+SIG AMP: NEGATIVE

## 2022-09-09 ENCOUNTER — PRE-ADMISSION TESTING (OUTPATIENT)
Dept: ADMISSIONS | Facility: MEDICAL CENTER | Age: 37
End: 2022-09-09
Attending: OBSTETRICS & GYNECOLOGY
Payer: COMMERCIAL

## 2022-09-09 VITALS — BODY MASS INDEX: 31.16 KG/M2 | HEIGHT: 56 IN

## 2022-09-09 RX ORDER — FLUCONAZOLE 150 MG/1
150 TABLET ORAL DAILY
Qty: 1 TABLET | Refills: 0 | Status: SHIPPED | OUTPATIENT
Start: 2022-09-09 | End: 2022-09-09

## 2022-09-09 RX ORDER — ACETAMINOPHEN 325 MG/1
650 TABLET ORAL EVERY 4 HOURS PRN
COMMUNITY
End: 2023-09-18

## 2022-09-09 NOTE — PREPROCEDURE INSTRUCTIONS
Pt preadmitted via phone, instructions emailed. Questions answered. Pt instructed to continue regularly prescribed meds through day of procedure. Per anesthesia protocol instructed to take these medications the day of procedure- atorvastatin. METs score >4.

## 2022-10-14 ENCOUNTER — APPOINTMENT (OUTPATIENT)
Dept: RADIOLOGY | Facility: MEDICAL CENTER | Age: 37
End: 2022-10-14
Attending: OBSTETRICS & GYNECOLOGY
Payer: COMMERCIAL

## 2022-10-21 ENCOUNTER — NON-PROVIDER VISIT (OUTPATIENT)
Dept: INTERNAL MEDICINE | Facility: OTHER | Age: 37
End: 2022-10-21
Payer: COMMERCIAL

## 2022-10-21 VITALS — WEIGHT: 138.67 LBS | BODY MASS INDEX: 33.43 KG/M2

## 2022-10-21 DIAGNOSIS — E11.9 TYPE 2 DIABETES MELLITUS TREATED WITHOUT INSULIN (HCC): ICD-10-CM

## 2022-10-21 PROCEDURE — 97803 MED NUTRITION INDIV SUBSEQ: CPT | Performed by: DIETITIAN, REGISTERED

## 2022-10-21 ASSESSMENT — FIBROSIS 4 INDEX: FIB4 SCORE: 0.41

## 2022-10-21 NOTE — PROGRESS NOTES
Jyotsna is here for FU with RD for nutrition education and support for her Type 2 diabetes.  Prev saw her for nutrition assessment in August 2022    A1c in March 2022 was 8.2%  A1c in August 2022 was 6.9%  Currently on jardiance and metformin daily     Since last appointment she has had ACL surgery and participating in PT 2 times per week  She has had a rough go with moving and not being able to do much for herself and relying on her  to do the cooking  Still avoiding sugar sweetened beverages  Not drinking much water since it's hard to get to the bathroom   Has been trying to check her BS - has seen 150 after eating  Last week she checked    Not drinking sugar sweetened beverages  Meals:   B - oatmeal, no fruit, maya seeds, nuts, almond milk   L - fruit, nuts,   Early dinner - meat, veggies, rice or beans or SOUP  S- slice of bread with ham and ratliff     Has not been eating tortilla lately as much   Has stopped buying sweets, chips and snacks that are high in carbs for the house and everyone is eating better at home     Reviewed with Jyotsna the changes she has made to her diet and plans to do for exercise as she heals and is released from ACL recovery. Set goals to support next steps; rtc with RD in 2 months    Time spent: 45 minutes

## 2022-10-21 NOTE — PATIENT INSTRUCTIONS
Goals:  If you feel dizzy, shaking then please check your blood sugar and if it is less than 70, that means you are having a low blood sugar and you need to eat some carbs (bread or crackers or fruit or 4 oz juice)   Try some prunes if you it challenging to go to the bathroom   Keep up with physical therapy and slowly add in walking when they so it's ok

## 2022-10-25 NOTE — TELEPHONE ENCOUNTER
Last seen: 8/24/22 by Dr. Davenport Next appt: none     Does patient have an active prescription for medications requested? No    Received Request Via: Pharmacy

## 2022-11-22 DIAGNOSIS — E11.9 TYPE 2 DIABETES MELLITUS TREATED WITHOUT INSULIN (HCC): ICD-10-CM

## 2022-11-30 NOTE — TELEPHONE ENCOUNTER
Empagliflozin Refill     Received request via: Pharmacy    Was the patient seen in the last year in this department? Yes    Does the patient have an active prescription (recently filled or refills available) for medication(s) requested? No    Does the patient have [facility] and need 100 day supply (blood pressure, diabetes and cholesterol meds only)? Patient does not have SCP

## 2022-12-01 DIAGNOSIS — E11.9 TYPE 2 DIABETES MELLITUS TREATED WITHOUT INSULIN (HCC): ICD-10-CM

## 2023-01-16 ENCOUNTER — NON-PROVIDER VISIT (OUTPATIENT)
Dept: INTERNAL MEDICINE | Facility: OTHER | Age: 38
End: 2023-01-16
Payer: COMMERCIAL

## 2023-01-16 VITALS — BODY MASS INDEX: 31.34 KG/M2 | WEIGHT: 130 LBS

## 2023-01-16 DIAGNOSIS — E78.1 HYPERTRIGLYCERIDEMIA: ICD-10-CM

## 2023-01-16 DIAGNOSIS — E11.9 TYPE 2 DIABETES MELLITUS TREATED WITHOUT INSULIN (HCC): ICD-10-CM

## 2023-01-16 PROCEDURE — 97803 MED NUTRITION INDIV SUBSEQ: CPT | Performed by: DIETITIAN, REGISTERED

## 2023-01-16 ASSESSMENT — FIBROSIS 4 INDEX: FIB4 SCORE: 0.41

## 2023-01-16 NOTE — PATIENT INSTRUCTIONS
Goals:  Schedule an appointment for follow up with Dr. Davenport and ask for a lab slip for your A1c  Keep up with limiting your carbs to 45 grams per meal and lean protein and veggies  Keep up with your exercise and slowly add in time and intensity as you recover with PT help

## 2023-01-16 NOTE — PROGRESS NOTES
Jyotsna is here for FU with RD for support with diabetes education     Reports she has not been feeling well since yesterday   Headache, dizziness  Checked her BS this morning 118 and the day before 125   Continues with Jardiance, Metformin    Recently noticed her appetite is much lower  Saw he weight decrease to 130#   Started to slowly add in exercise since her ACL repair   Feels her meals are the same and working to limit her carbs  Did find holidays to be more food but after that has stayed on track  Still struggles with bread, sweets, candy so limiting purchasing them  Not getting as much sleep as she would like    Jyotsna is still recovering from her knee surgery so exercise is not quite where she wants it to be but she is slowly working up and working with PT as well. Discussed the importance of having her A1c measured at least 2x/year so she will plan to request lab slip for that. We discussed the importance of her sticking to the 45 grams of carbs per meal and she has done a good job with making changes at home to support herself and the family too. Encouraged her to work on consistency right now. RTC with CHANDAN in 2 months    Time spent: 45 minutes

## 2023-01-19 NOTE — TELEPHONE ENCOUNTER
Received request via: Pharmacy    Was the patient seen in the last year in this department? Yes last seen: 08/24/2022 Dr Davenport next:02/08/2023 DR Davenport    Does the patient have an active prescription (recently filled or refills available) for medication(s) requested?  yes    Does the patient have [facility] Plus and need 100 day supply (blood pressure, diabetes and cholesterol meds only)? Patient does not have SCP

## 2023-01-20 RX ORDER — ATORVASTATIN CALCIUM 20 MG/1
20 TABLET, FILM COATED ORAL NIGHTLY
Qty: 30 TABLET | Refills: 3 | Status: SHIPPED | OUTPATIENT
Start: 2023-01-20 | End: 2023-06-01 | Stop reason: SDUPTHER

## 2023-02-08 ENCOUNTER — OFFICE VISIT (OUTPATIENT)
Dept: INTERNAL MEDICINE | Facility: OTHER | Age: 38
End: 2023-02-08
Payer: COMMERCIAL

## 2023-02-08 VITALS
HEIGHT: 56 IN | SYSTOLIC BLOOD PRESSURE: 105 MMHG | TEMPERATURE: 97.6 F | OXYGEN SATURATION: 98 % | BODY MASS INDEX: 30.32 KG/M2 | DIASTOLIC BLOOD PRESSURE: 69 MMHG | WEIGHT: 134.8 LBS | HEART RATE: 71 BPM

## 2023-02-08 DIAGNOSIS — E11.9 TYPE 2 DIABETES MELLITUS TREATED WITHOUT INSULIN (HCC): ICD-10-CM

## 2023-02-08 DIAGNOSIS — Z23 NEED FOR VACCINATION: ICD-10-CM

## 2023-02-08 DIAGNOSIS — E78.1 HYPERTRIGLYCERIDEMIA: ICD-10-CM

## 2023-02-08 PROBLEM — S83.512A NEW ACL TEAR, LEFT, INITIAL ENCOUNTER: Status: RESOLVED | Noted: 2022-02-03 | Resolved: 2023-02-08

## 2023-02-08 LAB
HBA1C MFR BLD: 7.2 % (ref ?–5.8)
POCT INT CON NEG: NEGATIVE
POCT INT CON POS: POSITIVE

## 2023-02-08 PROCEDURE — 83036 HEMOGLOBIN GLYCOSYLATED A1C: CPT | Performed by: STUDENT IN AN ORGANIZED HEALTH CARE EDUCATION/TRAINING PROGRAM

## 2023-02-08 PROCEDURE — 90471 IMMUNIZATION ADMIN: CPT | Performed by: INTERNAL MEDICINE

## 2023-02-08 PROCEDURE — 99213 OFFICE O/P EST LOW 20 MIN: CPT | Mod: 25,GE | Performed by: STUDENT IN AN ORGANIZED HEALTH CARE EDUCATION/TRAINING PROGRAM

## 2023-02-08 PROCEDURE — 90686 IIV4 VACC NO PRSV 0.5 ML IM: CPT | Performed by: INTERNAL MEDICINE

## 2023-02-08 ASSESSMENT — ENCOUNTER SYMPTOMS
SENSORY CHANGE: 0
TREMORS: 0
FOCAL WEAKNESS: 0
CHILLS: 0
HEARTBURN: 0
DIZZINESS: 0
BLOOD IN STOOL: 0
VOMITING: 0
HALLUCINATIONS: 0
PALPITATIONS: 0
COUGH: 0
SPEECH CHANGE: 0
SINUS PAIN: 0
BLURRED VISION: 0
HEADACHES: 0
NECK PAIN: 0
NERVOUS/ANXIOUS: 0
BRUISES/BLEEDS EASILY: 0
MYALGIAS: 0
ORTHOPNEA: 0
PHOTOPHOBIA: 0
SORE THROAT: 0
FEVER: 0
WEIGHT LOSS: 0
NAUSEA: 0
SPUTUM PRODUCTION: 0
BACK PAIN: 0
SHORTNESS OF BREATH: 0
DOUBLE VISION: 0
DEPRESSION: 0
HEMOPTYSIS: 0
ABDOMINAL PAIN: 0
TINGLING: 0

## 2023-02-08 ASSESSMENT — FIBROSIS 4 INDEX: FIB4 SCORE: 0.41

## 2023-02-08 ASSESSMENT — PATIENT HEALTH QUESTIONNAIRE - PHQ9: CLINICAL INTERPRETATION OF PHQ2 SCORE: 0

## 2023-02-08 ASSESSMENT — LIFESTYLE VARIABLES: SUBSTANCE_ABUSE: 0

## 2023-02-08 NOTE — PATIENT INSTRUCTIONS
- Continue Jardiance 10 mg daily  - Continue Metformin increase to 1000 mg AM and PM  - Continue  lipitor  - Strict diabetic diet  - Get labs prior next visit  - Follow up in June

## 2023-02-08 NOTE — PROGRESS NOTES
Established Patient    Patient Care Team:  Lei Davenport M.D. as PCP - General (Internal Medicine)    HPI:  Jyotsna Franks is a 37 y.o. female who presents today with the following Chief Complaint(s):   Chief Complaint   Patient presents with    Follow-Up    Diabetes Follow-up     Jyotsna is here for follow up for DM.  Last visit in October last year A1c: 6.9%.  She states feeling well, no complaints.  Eye exam less than a year ago unremarkable, no hx of proteinuria, excellent blood pressure.  Today at office A1c: 7.2% slightly higher than previous visit.  Current Medications: Metformin 1 gr daily and Jardiance 10 mg daily.    Review of Systems   Review of Systems   Constitutional:  Negative for chills, fever, malaise/fatigue and weight loss.   HENT:  Negative for congestion, sinus pain, sore throat and tinnitus.    Eyes:  Negative for blurred vision, double vision and photophobia.   Respiratory:  Negative for cough, hemoptysis, sputum production and shortness of breath.    Cardiovascular:  Negative for chest pain, palpitations, orthopnea and leg swelling.   Gastrointestinal:  Negative for abdominal pain, blood in stool, heartburn, melena, nausea and vomiting.   Genitourinary:  Negative for dysuria, frequency, hematuria and urgency.   Musculoskeletal:  Negative for back pain, joint pain, myalgias and neck pain.   Skin:  Negative for rash.   Neurological:  Negative for dizziness, tingling, tremors, sensory change, speech change, focal weakness and headaches.   Endo/Heme/Allergies:  Does not bruise/bleed easily.   Psychiatric/Behavioral:  Negative for depression, hallucinations, substance abuse and suicidal ideas. The patient is not nervous/anxious.        Past Medical History:   Diagnosis Date    Dental disorder     broken left side tooth, 2 missing teeth    Gestational diabetes     Gestational only with last two pregnancies    Gynecological disorder     hysterectomy    High cholesterol      "Hyperlipidemia        Patient Active Problem List    Diagnosis Date Noted    Type 2 diabetes mellitus treated without insulin ([facility]) 06/10/2022    Mcl deficiency, knee, left 02/03/2022    Thickened endometrium 02/10/2021    Pelvic pain 02/10/2021    Hypertriglyceridemia 11/02/2017    Decreased visual acuity 05/02/2017    Dyspareunia in female 03/21/2017    History of gestational diabetes 09/11/2014       Allergies:Pcn [penicillins] and Other environmental    Current Outpatient Medications   Medication Sig Dispense Refill    metformin (GLUCOPHAGE) 1000 MG tablet Take 1 Tablet by mouth every day. 90 Tablet 3    atorvastatin (LIPITOR) 20 MG Tab Take 1 Tablet by mouth every evening. 30 Tablet 3    Empagliflozin 10 MG Tab Take 1 Tablet by mouth every day. 30 Tablet 3    acetaminophen (TYLENOL) 325 MG Tab Take 650 mg by mouth every four hours as needed.       No current facility-administered medications for this visit.       Social History     Tobacco Use    Smoking status: Never    Smokeless tobacco: Never   Vaping Use    Vaping Use: Never used   Substance Use Topics    Alcohol use: Yes     Comment: 1 PER MONTH    Drug use: No       Family History   Problem Relation Age of Onset    Diabetes Mother     Heart Disease Maternal Grandmother     Cancer Maternal Grandmother         Throat Cancer    Diabetes Sister     Pancreatic Cancer Sister          Physical Exam  Vitals:  /69 (BP Location: Left arm, Patient Position: Sitting, BP Cuff Size: Adult)   Pulse 71   Temp 36.4 °C (97.6 °F) (Temporal)   Ht 1.422 m (4' 8\")   Wt 61.1 kg (134 lb 12.8 oz)   SpO2 98%  Body mass index is 30.22 kg/m².    Constitutional:  Not in acute distress, well appearing.  HEENT:   NC/AT  Cardiovascular: Regular rate and rhythm. No murmurs or gallops.      Lungs:   Clear to auscultation bilaterally. No wheezes or crackles. No respiratory distress.  Abdomen: Not distended, soft, not tender. No guarding or rigidity. No masses.  Extremities:  No " cyanosis/clubbing/edema. No obvious deformities.  Skin:  Warm and dry.  No visible rashes.  Neurologic: Alert & oriented x 3, CN II-XII grossly intact, strength and sensation grossly intact.  No focal deficits noted.  Psychiatric:  Affect normal, mood normal, judgment normal.      Assessment and Plan:     Diabetes Mellitus type 2 well controlled.  - Asymptomatic, no complaints.  - Excellent BP control w/o medications, No microalbuminuria. No need for ACE I or ARB  - Normal eye exam several months ago  - Adherence to diabetic diet most of the time, she says than can be improved.  - Currently undergoing through difficulties with marriage (no depressive symptoms, but more stress than usual)  - In house A1c: 7.2% in prior visit was 6.9%  - We will continue same regimen and assess in next visit        Plan:  - Continue Jardiance 10 mg daily  - Continue Metformin 1 gr daily  - Continue Lipitor  - Strict diabetic diet, regular exercise, weight loss  - Labs prior next visit  - Follow up in 3-4 months    Hypertriglyceridemia   - On lipitor  - Lipid profile prior next visit      Return in about 4 months (around 6/8/2023).      Please note that this dictation was created using voice recognition software. I have made every reasonable attempt to correct obvious errors, but I expect that there are errors of grammar and possibly content that I did not discover before finalizing the note.    Total face-to-face time spent in medical decision making:    Dr. Issac M.D. PGY-3  [facility]

## 2023-05-30 DIAGNOSIS — E11.9 TYPE 2 DIABETES MELLITUS TREATED WITHOUT INSULIN (HCC): ICD-10-CM

## 2023-06-01 RX ORDER — ATORVASTATIN CALCIUM 20 MG/1
20 TABLET, FILM COATED ORAL NIGHTLY
Qty: 30 TABLET | Refills: 3 | Status: SHIPPED | OUTPATIENT
Start: 2023-06-01 | End: 2023-09-18 | Stop reason: SDUPTHER

## 2023-06-01 NOTE — TELEPHONE ENCOUNTER
Received request via: Pharmacy    Was the patient seen in the last year in this department? Yes    Does the patient have an active prescription (recently filled or refills available) for medication(s) requested?  yes    Does the patient have [facility] Plus and need 100 day supply (blood pressure, diabetes and cholesterol meds only)? Patient does not have SCP

## 2023-06-07 ENCOUNTER — OFFICE VISIT (OUTPATIENT)
Dept: INTERNAL MEDICINE | Facility: OTHER | Age: 38
End: 2023-06-07
Payer: COMMERCIAL

## 2023-06-07 VITALS
HEART RATE: 79 BPM | WEIGHT: 142 LBS | SYSTOLIC BLOOD PRESSURE: 121 MMHG | DIASTOLIC BLOOD PRESSURE: 83 MMHG | OXYGEN SATURATION: 96 % | BODY MASS INDEX: 31.94 KG/M2 | TEMPERATURE: 98 F | HEIGHT: 56 IN

## 2023-06-07 DIAGNOSIS — E11.9 TYPE 2 DIABETES MELLITUS TREATED WITHOUT INSULIN (HCC): ICD-10-CM

## 2023-06-07 DIAGNOSIS — E78.1 HYPERTRIGLYCERIDEMIA: ICD-10-CM

## 2023-06-07 LAB
HBA1C MFR BLD: 7.6 % (ref ?–5.8)
POCT INT CON NEG: NEGATIVE
POCT INT CON POS: POSITIVE

## 2023-06-07 PROCEDURE — 83036 HEMOGLOBIN GLYCOSYLATED A1C: CPT | Mod: GC | Performed by: STUDENT IN AN ORGANIZED HEALTH CARE EDUCATION/TRAINING PROGRAM

## 2023-06-07 PROCEDURE — 3074F SYST BP LT 130 MM HG: CPT | Mod: GC | Performed by: STUDENT IN AN ORGANIZED HEALTH CARE EDUCATION/TRAINING PROGRAM

## 2023-06-07 PROCEDURE — 99214 OFFICE O/P EST MOD 30 MIN: CPT | Mod: GC | Performed by: STUDENT IN AN ORGANIZED HEALTH CARE EDUCATION/TRAINING PROGRAM

## 2023-06-07 PROCEDURE — 3079F DIAST BP 80-89 MM HG: CPT | Mod: GC | Performed by: STUDENT IN AN ORGANIZED HEALTH CARE EDUCATION/TRAINING PROGRAM

## 2023-06-07 RX ORDER — CETIRIZINE HYDROCHLORIDE 10 MG/1
10 TABLET ORAL DAILY
COMMUNITY

## 2023-06-07 ASSESSMENT — FIBROSIS 4 INDEX: FIB4 SCORE: 0.41

## 2023-06-07 NOTE — PROGRESS NOTES
Established Patient    Patient Care Team:  Lei Davenport M.D. as PCP - General (Internal Medicine)    HPI:  Jyotsna Franks is a 37 y.o. female who presents today with the following Chief Complaint(s):   Chief Complaint   Patient presents with    Diabetes     Jyotsna is here for follow up for DM and DLD.   She states that forgot to get lab work done.  In house Aic: 7.6% slightly worse than last visit.  She states that has not been consistent with her diet and also not able to exercise and has gained few lbs.  Otherwise she is asymptomatic and has no complaints.    Review of Systems   Constitutional: Denies chills, fever, fatigue/malaise, weight changes  HEENT: Denies blurry vision, congestion, sore throat  Respiratory: Denies shortness of breath, cough, wheezing   Cardiovascular: Denies chest pain, palpitations, leg swelling  Gastrointestinal: Denies heartburn, nausea, vomiting, abdominal pain, constipation, diarrhea  Genitourinary: Denies dysuria, frequency  Musculoskeletal: Denies falls and myalgias.   Skin: Denies itching and rash.   Neurological: Negative for headaches, dizziness, loss of consciousness      Past Medical History:   Diagnosis Date    Dental disorder     broken left side tooth, 2 missing teeth    Gestational diabetes     Gestational only with last two pregnancies    Gynecological disorder     hysterectomy    High cholesterol     Hyperlipidemia        Patient Active Problem List    Diagnosis Date Noted    Type 2 diabetes mellitus treated without insulin ([facility]) 06/10/2022    Mcl deficiency, knee, left 02/03/2022    Thickened endometrium 02/10/2021    Pelvic pain 02/10/2021    Hypertriglyceridemia 11/02/2017    Decreased visual acuity 05/02/2017    Dyspareunia in female 03/21/2017    History of gestational diabetes 09/11/2014       Allergies:Pcn [penicillins] and Other environmental    Current Outpatient Medications   Medication Sig Dispense Refill    cetirizine (ZYRTEC) 10 MG Tab  "Take 10 mg by mouth every day.      atorvastatin (LIPITOR) 20 MG Tab Take 1 Tablet by mouth every evening. 30 Tablet 3    Empagliflozin (JARDIANCE) 10 MG Tab tablet Take 1 Tablet by mouth every day. 30 Tablet 3    metformin (GLUCOPHAGE) 1000 MG tablet Take 1 Tablet by mouth every day. 90 Tablet 3    acetaminophen (TYLENOL) 325 MG Tab Take 650 mg by mouth every four hours as needed.       No current facility-administered medications for this visit.       Social History     Tobacco Use    Smoking status: Never    Smokeless tobacco: Never   Vaping Use    Vaping Use: Never used   Substance Use Topics    Alcohol use: Yes     Comment: 1 PER MONTH    Drug use: No       Family History   Problem Relation Age of Onset    Diabetes Mother     Heart Disease Maternal Grandmother     Cancer Maternal Grandmother         Throat Cancer    Diabetes Sister     Pancreatic Cancer Sister          Physical Exam  Vitals:  /83 (BP Location: Left arm, Patient Position: Sitting, BP Cuff Size: Adult)   Pulse 79   Temp 36.7 °C (98 °F) (Temporal)   Ht 1.422 m (4' 8\")   Wt 64.4 kg (142 lb)   SpO2 96%  Body mass index is 31.84 kg/m².    Constitutional:  Not in acute distress, well appearing.  HEENT:   NC/AT, EOMI, conjunctiva normal, hearing grossly intact  Cardiovascular: Regular rate and rhythm. No murmurs or gallops.      Lungs:   Clear to auscultation bilaterally. No wheezes or crackles. No respiratory distress.  Abdomen: Not distended, soft, not tender. No guarding or rigidity. No masses.  Extremities:  No cyanosis/clubbing/edema. No obvious deformities.  Skin:  Warm and dry.  No visible rashes.  Neurologic: Alert & oriented x 3, CN II-XII grossly intact, strength and sensation grossly intact.  No focal deficits noted.  Psychiatric:  Affect normal, mood normal, judgment normal.      Assessment and Plan:     DM type 2  - She has been stable over the past several months  - On Jardiance and metformin  - Forgot to get lab work  - Lately " not following her diet as she usually does, neither has been able to exercise  - gained few lbs  - No complaints at all  - She is not due for eye exam till next year  - we will get pending labs for next visit in 3 months   - In house Aic: 7.6% slightly worse than last visit       Plan:  - Continue Jardiance and Metformin same doses  - Adhere 100% to diet  - Incorporate regular exercise  - Weight loss  - Lab work in 3 months  - Re-establish care with new PCP in 3 months          Hypertriglyceridemia  -  Lipid prof last year in  tri  - No other abnormalities on lipid profile  - No Hx of gallstones  - CTM  - Lipid profile prior next visit       Problem List Items Addressed This Visit       Type 2 diabetes mellitus treated without insulin (HCC)    Relevant Orders    POCT  A1C    Hemoglobin A1c    Lipid Profile    Microalbumin Creat Ratio Urine - Lab Collect    Diabetic Monofilament Lower Extremity Exam (Completed)       Return in about 3 months (around 2023).    Please note that this dictation was created using voice recognition software. I have made every reasonable attempt to correct obvious errors, but I expect that there are errors of grammar and possibly content that I did not discover before finalizing the note.    Dr. Issac M.D. PGY-3  [facility] [employer]

## 2023-06-07 NOTE — PATIENT INSTRUCTIONS
- Continue medications no changes  - Get Lab work done mid September  - Follow up 1 week after lab work done  - Next visit re-establish with new PCP

## 2023-06-14 DIAGNOSIS — E11.9 TYPE 2 DIABETES MELLITUS TREATED WITHOUT INSULIN (HCC): ICD-10-CM

## 2023-08-24 ENCOUNTER — GYNECOLOGY VISIT (OUTPATIENT)
Dept: OBGYN | Facility: CLINIC | Age: 38
End: 2023-08-24
Payer: COMMERCIAL

## 2023-08-24 ENCOUNTER — HOSPITAL ENCOUNTER (OUTPATIENT)
Facility: MEDICAL CENTER | Age: 38
End: 2023-08-24
Attending: STUDENT IN AN ORGANIZED HEALTH CARE EDUCATION/TRAINING PROGRAM
Payer: COMMERCIAL

## 2023-08-24 VITALS
SYSTOLIC BLOOD PRESSURE: 149 MMHG | DIASTOLIC BLOOD PRESSURE: 78 MMHG | HEIGHT: 56 IN | BODY MASS INDEX: 31.49 KG/M2 | WEIGHT: 140 LBS

## 2023-08-24 DIAGNOSIS — E11.9 TYPE 2 DIABETES MELLITUS TREATED WITHOUT INSULIN (HCC): ICD-10-CM

## 2023-08-24 DIAGNOSIS — N89.8 VAGINAL DISCHARGE: ICD-10-CM

## 2023-08-24 DIAGNOSIS — Z01.419 ENCOUNTER FOR WELL WOMAN EXAM: ICD-10-CM

## 2023-08-24 DIAGNOSIS — N95.2 VAGINAL ATROPHY: ICD-10-CM

## 2023-08-24 DIAGNOSIS — N94.10 DYSPAREUNIA, FEMALE: ICD-10-CM

## 2023-08-24 PROCEDURE — 99213 OFFICE O/P EST LOW 20 MIN: CPT | Mod: 25 | Performed by: STUDENT IN AN ORGANIZED HEALTH CARE EDUCATION/TRAINING PROGRAM

## 2023-08-24 PROCEDURE — 87510 GARDNER VAG DNA DIR PROBE: CPT

## 2023-08-24 PROCEDURE — 87480 CANDIDA DNA DIR PROBE: CPT

## 2023-08-24 PROCEDURE — 87660 TRICHOMONAS VAGIN DIR PROBE: CPT

## 2023-08-24 PROCEDURE — 99395 PREV VISIT EST AGE 18-39: CPT | Performed by: STUDENT IN AN ORGANIZED HEALTH CARE EDUCATION/TRAINING PROGRAM

## 2023-08-24 PROCEDURE — 3078F DIAST BP <80 MM HG: CPT | Performed by: STUDENT IN AN ORGANIZED HEALTH CARE EDUCATION/TRAINING PROGRAM

## 2023-08-24 PROCEDURE — 3077F SYST BP >= 140 MM HG: CPT | Performed by: STUDENT IN AN ORGANIZED HEALTH CARE EDUCATION/TRAINING PROGRAM

## 2023-08-24 RX ORDER — ESTRADIOL 0.1 MG/G
CREAM VAGINAL
Qty: 42.5 G | Refills: 3 | Status: SHIPPED | OUTPATIENT
Start: 2023-08-24 | End: 2023-08-24

## 2023-08-24 RX ORDER — ESTRADIOL 0.1 MG/G
CREAM VAGINAL
Qty: 42.5 G | Refills: 3 | Status: SHIPPED | OUTPATIENT
Start: 2023-08-24

## 2023-08-24 ASSESSMENT — FIBROSIS 4 INDEX: FIB4 SCORE: 0.43

## 2023-08-24 NOTE — PROGRESS NOTES
ANNUAL GYNECOLOGY VISIT    Chief Complaint  Annual Exam (Vaginal dryness and white dishcharge)      Subjective  Jyotsna Franks is a 38 y.o. female  Patient's last menstrual period was 2021. Hx of total hysterectomy in . She comes in today for Annual Exam. She is also complaining of some mild vaginal dryness, which started a year ago. Did discuss this with Dr. Iverson previously who had recommended estrogen cream, but she denied at that time. She states that she has significant discomfort during intercourse despite using lubricant. Also, notices vaginal discomfort after intercourse. Over the past two days she has noticed some white vaginal discharge. Denies any vaginal itching. No vaginal odor. No dysuria. No vaginal bleeding. No abdominal pain.     Preventive Care   Immunization History   Administered Date(s) Administered    Influenza Vaccine H1N1 - HISTORICAL DATA 2009    Influenza Vaccine Quad Inj (Pf) 2023    PFIZER PURPLE CAP SARS-COV-2 VACCINATION (12+) 2021, 2021       Gynecology History and ROS  Current Sexual Activity: yes - with   History of sexually transmitted diseases? no  Abnormal discharge? yes - white discharge  Current Contraception: total hysterectomy     Menstrual History  Patient's last menstrual period was 2021.      Pap History  Last pap smear:   History of moderate or severe dysplasia: no    Cancer Risk Assessement:  Family history of:   - Breast cancer: no   - Ovarian cancer: no   - Uterine cancer: no   - Colon cancer: no    Obstetric History  OB History    Para Term  AB Living   6 5 5   1 5   SAB IAB Ectopic Molar Multiple Live Births     1       5      # Outcome Date GA Lbr Mariano/2nd Weight Sex Delivery Anes PTL Lv   6 Term      Vag-Spont   SHELTON   5 Term     M Vag-Spont   SHELTON   4 Term     M Vag-Spont   SHELTON   3 Term     M Vag-Spont   SHELTON   2 Term     M Vag-Spont   SHELTON   1 IAB                Past Medical  History  Past Medical History:   Diagnosis Date    Dental disorder     broken left side tooth, 2 missing teeth    Gestational diabetes     Gestational only with last two pregnancies    Gynecological disorder     hysterectomy    High cholesterol     Hyperlipidemia        Past Surgical History  Past Surgical History:   Procedure Laterality Date    PB KNEE SCOPE,AID ANT CRUCIATE REPAIR Left 9/23/2022    Procedure: LEFT KNEE ARTHROSCOPY ANTERIOR CRUCIATE LIGAMENT RECONSTRUCTION WITH HAMSTING AUTOGRAPH POSSIBLE  ALLOGRAFT, REPAIRS AS INDICATED;  Surgeon: Winston Lozano M.D.;  Location: [facility];  Service: Orthopedics    HYSTERECTOMY LAPAROSCOPY  6/2/2021    Procedure: HYSTERECTOMY, LAPAROSCOPIC-TOTAL;  Surgeon: Juan F Hanks M.D.;  Location: SURGERY SAME DAY [facility];  Service: Obstetrics    SALPINGECTOMY Bilateral 6/2/2021    Procedure: SALPINGECTOMY;  Surgeon: Juan F Hanks M.D.;  Location: SURGERY SAME DAY [facility];  Service: Obstetrics    CYSTOSCOPY N/A 6/2/2021    Procedure: CYSTOSCOPY;  Surgeon: Juan F Hanks M.D.;  Location: SURGERY SAME DAY [facility];  Service: Obstetrics    ANTERIOR AND POSTERIOR REPAIR N/A 6/2/2021    Procedure: MCCALLS CULDOPLASTY;  Surgeon: Juan F Hanks M.D.;  Location: SURGERY SAME DAY [facility];  Service: Obstetrics       Social History  Social History     Tobacco Use    Smoking status: Never    Smokeless tobacco: Never   Vaping Use    Vaping Use: Never used   Substance Use Topics    Alcohol use: Yes     Alcohol/week: 1.2 oz     Types: 2 Standard drinks or equivalent per week     Comment: 2 drinks per week    Drug use: No        Family History  Family History   Problem Relation Age of Onset    Diabetes Mother     Heart Disease Maternal Grandmother     Cancer Maternal Grandmother         Throat Cancer    Diabetes Sister     Pancreatic Cancer Sister        Home Medications  Current Outpatient Medications   Medication Sig    metformin (GLUCOPHAGE)  "1000 MG tablet Take 1 Tablet by mouth every day.    cetirizine (ZYRTEC) 10 MG Tab Take 10 mg by mouth every day.    atorvastatin (LIPITOR) 20 MG Tab Take 1 Tablet by mouth every evening.    Empagliflozin (JARDIANCE) 10 MG Tab tablet Take 1 Tablet by mouth every day.    acetaminophen (TYLENOL) 325 MG Tab Take 650 mg by mouth every four hours as needed.       Allergies/Reactions  Allergies   Allergen Reactions    Pcn [Penicillins] Anaphylaxis    Other Environmental      pollens       ROS  Positive ROS: vaginal dryness and pain with intercourse  Gen: no fevers or chills, no significant weight loss or gain, excessive fatigue  Respiratory:  no cough or dyspnea  Cardiac:  no chest pain, no palpitations, no syncope  Breast: no breast discharge, pain, lump or skin changes  GI:  no heartburn, no abdominal pain, no nausea or vomiting  Urinary: no dysuria, urgency, frequency, incontinence   Psych: no depression or anxiety  Neuro: no migraines with aura, fainting spells, numbness or tingling  Extremities: no joint pain, persistently swollen ankles, recurrent leg cramps      Physical Examination:  Vital Signs: BP (!) 149/78 (BP Location: Left arm, Patient Position: Sitting, BP Cuff Size: Adult)   Ht 4' 8\"   Wt 140 lb   LMP 04/20/2021   BMI 31.39 kg/m²       Constitutional: The patient is well developed and well nourished.  Psychiatric: Patient is oriented to time place and person.   Skin: No rash observed.  Neck: Appears symmetric. Thyroid normal size  Respiratory: normal effort  Breast: Inspection reveals no asymetry or nipple discharge, no skin thickening, dimpling or erythema.  Palpation demonstrates no masses.  Abdomen: Soft, non-tender.  Pelvic Exam:      Vulva: external female genitalia present with mild atrophy noted. No lesions     Urethra - no lesions, no erythema     Vagina: dry, pink, normal ruggae     Ovaries: non-tender, no appreciable masses    Pap Smear performed: No  Extremeties: Legs are symmetric and " without tenderness. There is no edema present.        Assessment & Plan  Jyotsna Franks is a 38 y.o. female who presents today for Annual Gyn Exam.     1. Encounter for well woman exam  - Anticipatory guidance given.   - Encouraged adequate water intake, healthy diet, regular exercise.   - Educated on Pap smear screening and guidelines for age per ACOG and ASSCP.   - Discussed safe sex, STI prevention, contraception/family planning.   - Self breast exam taught.     2. Vaginal discharge  - VAGINAL PATHOGENS DNA PANEL; Future    3. Type 2 diabetes mellitus treated without insulin (HCC)  Continue with your daily DM medications.    4. Vaginal atrophy/Dyspareunia, female  - Her exam confirms vaginal atrophy / genitorurinary syndrome of menopause. This is very common and due to low estrogen levels, which render the vaginal tissue thin, irritated, and open to colonization with gut nicko. This can lead to irritation, dryness, painful sex, urinary infections and urinary urgency. Discussed risks, benefits, and indications for vaginal estrogen therapy.  Vaginal estrogen has negligible absorption into the bloodstream and is not associated with increased risks for uterine or breast cancers  - estradiol (ESTRACE VAGINAL) 0.1 MG/GM vaginal cream; Apply 1g cream inside vagina using applicator nightly for 2 weeks, then twice per week thereafter  Dispense: 42.5 g; Refill: 3      Return: Annually or PRN    Teri Arias P.A.-C.

## 2023-08-25 ENCOUNTER — TELEPHONE (OUTPATIENT)
Dept: OBGYN | Facility: CLINIC | Age: 38
End: 2023-08-25
Payer: COMMERCIAL

## 2023-08-25 DIAGNOSIS — B37.31 YEAST INFECTION OF THE VAGINA: ICD-10-CM

## 2023-08-25 LAB
CANDIDA DNA VAG QL PROBE+SIG AMP: POSITIVE
G VAGINALIS DNA VAG QL PROBE+SIG AMP: NEGATIVE
T VAGINALIS DNA VAG QL PROBE+SIG AMP: NEGATIVE

## 2023-08-25 RX ORDER — FLUCONAZOLE 100 MG/1
100 TABLET ORAL
Qty: 2 TABLET | Refills: 0 | Status: SHIPPED | OUTPATIENT
Start: 2023-08-25 | End: 2023-08-29

## 2023-08-25 NOTE — TELEPHONE ENCOUNTER
Called pt per Antwon to let her know she has a yeast infection/ meds were sent in to pharmacy  Pt stated she already picked up meds and had no further questions at this time

## 2023-09-15 ENCOUNTER — HOSPITAL ENCOUNTER (OUTPATIENT)
Dept: LAB | Facility: MEDICAL CENTER | Age: 38
End: 2023-09-15
Attending: STUDENT IN AN ORGANIZED HEALTH CARE EDUCATION/TRAINING PROGRAM
Payer: COMMERCIAL

## 2023-09-15 DIAGNOSIS — E11.9 TYPE 2 DIABETES MELLITUS TREATED WITHOUT INSULIN (HCC): ICD-10-CM

## 2023-09-15 LAB
CHOLEST SERPL-MCNC: 142 MG/DL (ref 100–199)
EST. AVERAGE GLUCOSE BLD GHB EST-MCNC: 171 MG/DL
FASTING STATUS PATIENT QL REPORTED: NORMAL
HBA1C MFR BLD: 7.6 % (ref 4–5.6)
HDLC SERPL-MCNC: 46 MG/DL
LDLC SERPL CALC-MCNC: 65 MG/DL
TRIGL SERPL-MCNC: 156 MG/DL (ref 0–149)

## 2023-09-15 PROCEDURE — 36415 COLL VENOUS BLD VENIPUNCTURE: CPT

## 2023-09-15 PROCEDURE — 83036 HEMOGLOBIN GLYCOSYLATED A1C: CPT

## 2023-09-15 PROCEDURE — 82570 ASSAY OF URINE CREATININE: CPT

## 2023-09-15 PROCEDURE — 82043 UR ALBUMIN QUANTITATIVE: CPT

## 2023-09-15 PROCEDURE — 80061 LIPID PANEL: CPT

## 2023-09-16 LAB
CREAT UR-MCNC: 46.15 MG/DL
MICROALBUMIN UR-MCNC: <1.2 MG/DL
MICROALBUMIN/CREAT UR: NORMAL MG/G (ref 0–30)

## 2023-09-18 ENCOUNTER — OFFICE VISIT (OUTPATIENT)
Dept: INTERNAL MEDICINE | Facility: OTHER | Age: 38
End: 2023-09-18
Payer: COMMERCIAL

## 2023-09-18 VITALS
HEIGHT: 56 IN | HEART RATE: 76 BPM | SYSTOLIC BLOOD PRESSURE: 107 MMHG | DIASTOLIC BLOOD PRESSURE: 67 MMHG | BODY MASS INDEX: 31.81 KG/M2 | WEIGHT: 141.4 LBS | TEMPERATURE: 97.9 F | OXYGEN SATURATION: 96 %

## 2023-09-18 DIAGNOSIS — Z23 NEED FOR VACCINATION: ICD-10-CM

## 2023-09-18 DIAGNOSIS — G47.00 INSOMNIA, UNSPECIFIED TYPE: ICD-10-CM

## 2023-09-18 DIAGNOSIS — R53.83 FATIGUE, UNSPECIFIED TYPE: ICD-10-CM

## 2023-09-18 DIAGNOSIS — E11.9 TYPE 2 DIABETES MELLITUS TREATED WITHOUT INSULIN (HCC): ICD-10-CM

## 2023-09-18 PROCEDURE — 99214 OFFICE O/P EST MOD 30 MIN: CPT | Mod: 25,GC

## 2023-09-18 PROCEDURE — 3078F DIAST BP <80 MM HG: CPT

## 2023-09-18 PROCEDURE — 3074F SYST BP LT 130 MM HG: CPT

## 2023-09-18 PROCEDURE — 90686 IIV4 VACC NO PRSV 0.5 ML IM: CPT

## 2023-09-18 PROCEDURE — 90471 IMMUNIZATION ADMIN: CPT

## 2023-09-18 RX ORDER — ATORVASTATIN CALCIUM 20 MG/1
20 TABLET, FILM COATED ORAL NIGHTLY
Qty: 90 TABLET | Refills: 3 | Status: SHIPPED | OUTPATIENT
Start: 2023-09-18

## 2023-09-18 ASSESSMENT — FIBROSIS 4 INDEX: FIB4 SCORE: 0.43

## 2023-09-18 NOTE — PATIENT INSTRUCTIONS
-Only use bed for sleep of sex, see handout for further instructions  -Get labs prior to next appointment  -Eat more fruits and vegetables in your diet  -Exercise for at least 30 minutes a day.   -Remember to take your metformin twice a day  -Continue Jardiance   -Let us know where you want to send your prescriptions.   -It was nice visiting with you today!

## 2023-09-18 NOTE — PROGRESS NOTES
Established Patient    Patient Care Team:  Lei Davenport M.D. as PCP - General (Internal Medicine)    Jyotsna Franks is a 38 y.o. female who presents today with the following Chief Complaint(s): Follow up for Diagnoses of Type 2 diabetes mellitus treated without insulin (HCC), Fatigue, unspecified type, Insomnia, unspecified type, and Need for vaccination were pertinent to this visit.    HPI:  Jyotsna Franks is a 38 year-old female with a past medical history of Type 2 DM (A1c 7.6 9/2023) and hypertriglyceridemia who presents to the clinic today for follow-up and complaint of fatigue.    Labs 9/15/2023:  Hemoglobin A1c 7.6  Lipid panel:   HDL 46 LDL 65  Microalbumin to creatinine ratio wnl    Currently on metformin 1000 mg twice daily and Jardiance 10 mg daily.  Does report missing her medications at times.  Also, notes her diet needs work and she does not get any exercise.  Denies any increased thirst, increased urination, lightheadedness, dizziness, chest pain, palpitations rotations, shortness of breath or numbness/tingling.      For the past couple months, has been noticing increase fatigued she describes as tired.  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  Does not exercise and diet not full of fruits and vegetables.  Gets about 5-7 hours of sleep.  Denies snoring.  Does have difficulty falling asleep, but no difficulty staying asleep.  Prior to bedtime watches television or is on her phone.  Denies any lightheadedness, dizziness, diaphoresis, weight changes, vision problems, chest pain, palpitations, shortness of breath, extremity edema, nausea, vomiting, abdominal pain, diarrhea, constipation, black or bloody stools, weakness or numbness/tingling. No new medications. No recent illness. No recent stressors, anxiety or depression.     ROS:     General: No fevers, chills, night sweats, weight loss or gain  HEENT: No hearing changes, vision changes, eye pain,  "ear pain, nasal discharge, sore throat  Neck: No swelling in neck  Pulmonary: No shortness of breath, cough, sputum, or hemoptysis  Cardiovascular: No chest pain, palpitations, or LE swelling  GI: No nausea, vomiting, diarrhea, constipation, abdominal pain, hematochezia or melena  : No dysuria or frequency  Neuro: No focal weakness, no general weakness, no headaches, no lightheadedness, no dizziness  Psych: No anxiety or depression    Past Medical History:   Diagnosis Date    Dental disorder     broken left side tooth, 2 missing teeth    Gestational diabetes     Gestational only with last two pregnancies    Gynecological disorder     hysterectomy    High cholesterol     Hyperlipidemia      Social History     Tobacco Use    Smoking status: Never    Smokeless tobacco: Never   Vaping Use    Vaping Use: Never used   Substance Use Topics    Alcohol use: Yes     Alcohol/week: 1.2 oz     Types: 2 Standard drinks or equivalent per week     Comment: 2 drinks per week    Drug use: No     Current Outpatient Medications   Medication Sig Dispense Refill    metformin (GLUCOPHAGE) 1000 MG tablet Take 1 Tablet by mouth 2 times a day with meals. 90 Tablet 4    Empagliflozin (JARDIANCE) 10 MG Tab tablet Take 1 Tablet by mouth every day. 90 Tablet 3    atorvastatin (LIPITOR) 20 MG Tab Take 1 Tablet by mouth every evening. 90 Tablet 3    estradiol (ESTRACE VAGINAL) 0.1 MG/GM vaginal cream Apply 1g cream inside vagina using applicator nightly for 2 weeks, then twice per week thereafter 42.5 g 3    cetirizine (ZYRTEC) 10 MG Tab Take 10 mg by mouth every day.       No current facility-administered medications for this visit.       Physical Exam:  /67 (BP Location: Right arm, Patient Position: Sitting, BP Cuff Size: Adult)   Pulse 76   Temp 36.6 °C (97.9 °F) (Temporal)   Ht 1.422 m (4' 8\")   Wt 64.1 kg (141 lb 6.4 oz)   LMP 04/20/2021   SpO2 96%   BMI 31.70 kg/m²   General: Well developed, well nourished female, in no " distress.  HEENT: NC/AT, PERRL, EOMI, no scleral icterus or conjunctival pallor, fair dentition  Neck: Supple, No cervical or supraclavicular LAD  CV:RRR, no murmurs gallops or Rubs  Pulm: LCAB, no crackles, rales, rhonchi, or wheezing  GI: Normal bowel sounds, abdomen soft, nontender, distended to deep or light palpation in all 4 quadrants, no HSM.  MSK: Radial pulses 2+ and equal bilaterally.  Strength 5 out of 5 in upper and lower extremities.  No lower extremity edema  Neuro: Patient is alert and oriented x3, no focal deficits  Psych: Appropriate mood and affect       Assessment and Plan:   1. Type 2 diabetes mellitus treated without insulin (HCC)  Most recent A1c 7.6  Lipid panel:   HDL 46 LDL 65  Microalbumin to creatinine ratio wnl  Diabetic medications include metformin 1000 mg BID, Jardiance 10 mg daily, atorvastatin 20 mg daily  Sent refills of medications to [facility] pharmacy  Discussed diet and exercise with patient, recommended eating more fruits and vegetables and getting at least 30  minutes of exercise daily.   Counseled patient on medication adherence  Will check Vitamin B12 while on metformin.   Will recheck Hemoglobin A1c in 3 months.   - VITAMIN B12; Future  - HEMOGLOBIN A1C; Future  Will follow-up in 6 weeks to revisit medication compliance    2. Fatigue, unspecified type  Likely due to poor diet, exercise and sleep habits  Will rule out organic causes that include anemia, iron deficiency, thyroid issues, vitamin D deficiency, electrolyte abnormalities and hepatic cause   - CBC WITH DIFFERENTIAL; Future  - Comp Metabolic Panel; Future  - TSH; Future  - FREE THYROXINE; Future  - VITAMIN D,25 HYDROXY (DEFICIENCY); Future  - Iron; Future  - Ferritin; Future  -Transferrin; Future  -Transferrin saturation; Future  Will follow-up in 6 weeks to go over labs    3. Insomnia, unspecified type  Likely result of sleep habits prior to bedtime   Discussed sleep hygiene. Handout provided  Will follow-up  in 6 weeks. If no improvement, can place referral for CBT.     4. Need for vaccination  - INFLUENZA VACCINE QUAD INJ (PF)    Return in about 6 weeks (around 10/30/2023).    Patient Instructions   -Only use bed for sleep of sex, see handout for further instructions  -Get labs prior to next appointment  -Eat more fruits and vegetables in your diet  -Exercise for at least 30 minutes a day.   -Remember to take your metformin twice a day  -Continue Jardiance   -Let us know where you want to send your prescriptions.   -It was nice visiting with you today!        Hina Geiger M.D. PGY-3  [facility]    This note was created using voice recognition software.  While every attempt is made to ensure accuracy of transcription, occasionally errors occur.

## 2025-06-13 SDOH — ECONOMIC STABILITY: FOOD INSECURITY: WITHIN THE PAST 12 MONTHS, THE FOOD YOU BOUGHT JUST DIDN'T LAST AND YOU DIDN'T HAVE MONEY TO GET MORE.: NEVER TRUE

## 2025-06-13 SDOH — ECONOMIC STABILITY: INCOME INSECURITY: HOW HARD IS IT FOR YOU TO PAY FOR THE VERY BASICS LIKE FOOD, HOUSING, MEDICAL CARE, AND HEATING?: NOT HARD AT ALL

## 2025-06-13 SDOH — ECONOMIC STABILITY: INCOME INSECURITY: IN THE LAST 12 MONTHS, WAS THERE A TIME WHEN YOU WERE NOT ABLE TO PAY THE MORTGAGE OR RENT ON TIME?: NO

## 2025-06-13 SDOH — ECONOMIC STABILITY: FOOD INSECURITY: WITHIN THE PAST 12 MONTHS, YOU WORRIED THAT YOUR FOOD WOULD RUN OUT BEFORE YOU GOT MONEY TO BUY MORE.: NEVER TRUE

## 2025-06-13 SDOH — HEALTH STABILITY: PHYSICAL HEALTH: ON AVERAGE, HOW MANY DAYS PER WEEK DO YOU ENGAGE IN MODERATE TO STRENUOUS EXERCISE (LIKE A BRISK WALK)?: 0 DAYS

## 2025-06-13 SDOH — HEALTH STABILITY: PHYSICAL HEALTH: ON AVERAGE, HOW MANY MINUTES DO YOU ENGAGE IN EXERCISE AT THIS LEVEL?: 0 MIN

## 2025-06-13 ASSESSMENT — LIFESTYLE VARIABLES
AUDIT-C TOTAL SCORE: 7
HOW MANY STANDARD DRINKS CONTAINING ALCOHOL DO YOU HAVE ON A TYPICAL DAY: 1 OR 2
SKIP TO QUESTIONS 9-10: 0
HOW OFTEN DO YOU HAVE SIX OR MORE DRINKS ON ONE OCCASION: WEEKLY
HOW OFTEN DO YOU HAVE A DRINK CONTAINING ALCOHOL: 4 OR MORE TIMES A WEEK

## 2025-06-13 ASSESSMENT — SOCIAL DETERMINANTS OF HEALTH (SDOH)
DO YOU BELONG TO ANY CLUBS OR ORGANIZATIONS SUCH AS CHURCH GROUPS UNIONS, FRATERNAL OR ATHLETIC GROUPS, OR SCHOOL GROUPS?: NO
HOW OFTEN DO YOU GET TOGETHER WITH FRIENDS OR RELATIVES?: ONCE A WEEK
HOW OFTEN DO YOU GET TOGETHER WITH FRIENDS OR RELATIVES?: ONCE A WEEK
IN A TYPICAL WEEK, HOW MANY TIMES DO YOU TALK ON THE PHONE WITH FAMILY, FRIENDS, OR NEIGHBORS?: MORE THAN THREE TIMES A WEEK
HOW OFTEN DO YOU ATTENT MEETINGS OF THE CLUB OR ORGANIZATION YOU BELONG TO?: NEVER
HOW HARD IS IT FOR YOU TO PAY FOR THE VERY BASICS LIKE FOOD, HOUSING, MEDICAL CARE, AND HEATING?: NOT HARD AT ALL
HOW OFTEN DO YOU ATTEND CHURCH OR RELIGIOUS SERVICES?: MORE THAN 4 TIMES PER YEAR
WITHIN THE PAST 12 MONTHS, YOU WORRIED THAT YOUR FOOD WOULD RUN OUT BEFORE YOU GOT THE MONEY TO BUY MORE: NEVER TRUE
IN THE PAST 12 MONTHS, HAS THE ELECTRIC, GAS, OIL, OR WATER COMPANY THREATENED TO SHUT OFF SERVICE IN YOUR HOME?: NO
IN A TYPICAL WEEK, HOW MANY TIMES DO YOU TALK ON THE PHONE WITH FAMILY, FRIENDS, OR NEIGHBORS?: MORE THAN THREE TIMES A WEEK
HOW OFTEN DO YOU ATTEND CHURCH OR RELIGIOUS SERVICES?: MORE THAN 4 TIMES PER YEAR
HOW MANY DRINKS CONTAINING ALCOHOL DO YOU HAVE ON A TYPICAL DAY WHEN YOU ARE DRINKING: 1 OR 2
DO YOU BELONG TO ANY CLUBS OR ORGANIZATIONS SUCH AS CHURCH GROUPS UNIONS, FRATERNAL OR ATHLETIC GROUPS, OR SCHOOL GROUPS?: NO
HOW OFTEN DO YOU HAVE SIX OR MORE DRINKS ON ONE OCCASION: WEEKLY
HOW OFTEN DO YOU HAVE A DRINK CONTAINING ALCOHOL: 4 OR MORE TIMES A WEEK
HOW OFTEN DO YOU ATTENT MEETINGS OF THE CLUB OR ORGANIZATION YOU BELONG TO?: NEVER

## 2025-06-16 ENCOUNTER — APPOINTMENT (OUTPATIENT)
Dept: MEDICAL GROUP | Age: 40
End: 2025-06-16
Payer: COMMERCIAL

## 2025-06-16 VITALS
WEIGHT: 137 LBS | DIASTOLIC BLOOD PRESSURE: 72 MMHG | HEART RATE: 87 BPM | HEIGHT: 56 IN | TEMPERATURE: 97.5 F | SYSTOLIC BLOOD PRESSURE: 124 MMHG | OXYGEN SATURATION: 96 % | BODY MASS INDEX: 30.82 KG/M2

## 2025-06-16 DIAGNOSIS — E78.1 HYPERTRIGLYCERIDEMIA: Primary | ICD-10-CM

## 2025-06-16 DIAGNOSIS — E11.9 TYPE 2 DIABETES MELLITUS TREATED WITHOUT INSULIN (HCC): ICD-10-CM

## 2025-06-16 DIAGNOSIS — Z00.00 BLOOD TESTS FOR ROUTINE GENERAL PHYSICAL EXAMINATION: ICD-10-CM

## 2025-06-16 DIAGNOSIS — Z12.31 ENCOUNTER FOR SCREENING MAMMOGRAM FOR MALIGNANT NEOPLASM OF BREAST: ICD-10-CM

## 2025-06-16 LAB — RETINAL SCREEN: NEGATIVE

## 2025-06-16 PROCEDURE — 99204 OFFICE O/P NEW MOD 45 MIN: CPT | Performed by: STUDENT IN AN ORGANIZED HEALTH CARE EDUCATION/TRAINING PROGRAM

## 2025-06-16 PROCEDURE — 3078F DIAST BP <80 MM HG: CPT | Performed by: STUDENT IN AN ORGANIZED HEALTH CARE EDUCATION/TRAINING PROGRAM

## 2025-06-16 PROCEDURE — 3074F SYST BP LT 130 MM HG: CPT | Performed by: STUDENT IN AN ORGANIZED HEALTH CARE EDUCATION/TRAINING PROGRAM

## 2025-06-16 PROCEDURE — 92228 IMG RTA DETC/MNTR DS PHY/QHP: CPT | Performed by: STUDENT IN AN ORGANIZED HEALTH CARE EDUCATION/TRAINING PROGRAM

## 2025-06-16 RX ORDER — ATORVASTATIN CALCIUM 20 MG/1
20 TABLET, FILM COATED ORAL NIGHTLY
Qty: 100 TABLET | Refills: 3 | Status: SHIPPED | OUTPATIENT
Start: 2025-06-16 | End: 2026-07-21

## 2025-06-16 ASSESSMENT — ENCOUNTER SYMPTOMS
DEPRESSION: 0
HEADACHES: 0
DOUBLE VISION: 0
ABDOMINAL PAIN: 0
PALPITATIONS: 0
BRUISES/BLEEDS EASILY: 0
SHORTNESS OF BREATH: 0
FEVER: 0
CHILLS: 0
COUGH: 0
BLURRED VISION: 0
WEAKNESS: 0
WHEEZING: 0
SENSORY CHANGE: 0
DIZZINESS: 0
BLOOD IN STOOL: 0
ORTHOPNEA: 0
BACK PAIN: 0

## 2025-06-16 ASSESSMENT — LIFESTYLE VARIABLES: SUBSTANCE_ABUSE: 0

## 2025-06-16 ASSESSMENT — PATIENT HEALTH QUESTIONNAIRE - PHQ9: CLINICAL INTERPRETATION OF PHQ2 SCORE: 0

## 2025-06-16 NOTE — PROGRESS NOTES
"Subjective:     CC: [facility], new patient.    HPI:   History of Present Illness  The patient is a 39-year-old female who presents to [facility].    She has been without medication for the past 2 months due to a lapse in her insurance coverage, which also prevented her from seeking medical attention. Her previous physician had provided her with 6 refills of her medications, which she continued to take until they were exhausted. However, she did not undergo any laboratory tests during this period. She was previously on metformin 1000 mg twice daily and Jardiance, but has been off these medications for the past 2 months. She also takes cholesterol medication. She has an upcoming appointment with an ophthalmologist next month for an eye examination.    MEDICATIONS  Discontinued: Metformin, Jardiance       ROS:  Review of Systems   Constitutional:  Negative for chills, fever and malaise/fatigue.   HENT:  Negative for nosebleeds and tinnitus.    Eyes:  Negative for blurred vision and double vision.   Respiratory:  Negative for cough, shortness of breath and wheezing.    Cardiovascular:  Negative for chest pain, palpitations, orthopnea and leg swelling.   Gastrointestinal:  Negative for abdominal pain, blood in stool and melena.   Genitourinary:  Negative for dysuria, frequency and urgency.   Musculoskeletal:  Negative for back pain and joint pain.   Skin:  Negative for rash.   Neurological:  Negative for dizziness, sensory change, weakness and headaches.   Endo/Heme/Allergies:  Does not bruise/bleed easily.   Psychiatric/Behavioral:  Negative for depression, substance abuse and suicidal ideas.        Objective:     Exam:  /72 (BP Location: Left arm, Patient Position: Sitting, BP Cuff Size: Adult)   Pulse 87   Temp 36.4 °C (97.5 °F) (Temporal)   Ht 1.422 m (4' 8\")   Wt 62.1 kg (137 lb)   LMP 04/20/2021   SpO2 96%   Breastfeeding No   BMI 30.71 kg/m²  Body mass index is 30.71 kg/m².    Physical " Exam  Vitals reviewed.   Constitutional:       General: She is not in acute distress.  HENT:      Head: Normocephalic and atraumatic.      Mouth/Throat:      Mouth: Mucous membranes are moist.   Eyes:      General: No scleral icterus.     Extraocular Movements: Extraocular movements intact.      Pupils: Pupils are equal, round, and reactive to light.   Cardiovascular:      Rate and Rhythm: Normal rate and regular rhythm.      Pulses: Normal pulses.      Heart sounds: Normal heart sounds. No murmur heard.  Pulmonary:      Effort: Pulmonary effort is normal. No respiratory distress.      Breath sounds: Normal breath sounds. No wheezing.   Abdominal:      General: There is no distension.      Palpations: Abdomen is soft.      Tenderness: There is no abdominal tenderness. There is no guarding or rebound.   Musculoskeletal:      Cervical back: Normal range of motion and neck supple.      Right lower leg: No edema.      Left lower leg: No edema.   Skin:     General: Skin is warm.      Capillary Refill: Capillary refill takes less than 2 seconds.      Coloration: Skin is not jaundiced.      Findings: No bruising.   Neurological:      General: No focal deficit present.      Mental Status: She is alert.   Psychiatric:         Mood and Affect: Mood normal.         Behavior: Behavior normal.         Thought Content: Thought content normal.       Labs: Ordered.     Assessment & Plan:       1. Type 2 diabetes mellitus treated without insulin (HCC)  She has not taken her diabetes medications for the past 2 months due to insurance issues. A comprehensive set of laboratory tests will be conducted, including retinopathy screening and urine protein analysis. She will be prescribed metformin 1000 mg to be taken twice daily. An eye examination will be scheduled for today.  Monofilament testing with a 10 gram force: sensation intact: intact bilaterally  Visual Inspection: Feet without maceration, ulcers, fissures.  Pedal pulses: intact  bilaterally     - Comp Metabolic Panel; Future  - HEMOGLOBIN A1C; Future  - VITAMIN B12; Future  - Diabetic Monofilament LE Exam  - POCT Retinal Eye Exam  - Microalbumin Creat Ratio Urine (Lab Collect); Future  - metformin (GLUCOPHAGE) 1000 MG tablet; Take 1 Tablet by mouth 2 times a day.  Dispense: 200 Tablet; Refill: 3  - atorvastatin (LIPITOR) 20 MG Tab; Take 1 Tablet by mouth every evening.  Dispense: 100 Tablet; Refill: 3    2. Hypertriglyceridemia (Primary)  She has not taken her cholesterol medication for the past 2 months due to insurance issues. A comprehensive set of laboratory tests will be conducted. She will be prescribed her cholesterol medication.  - Lipid Profile; Future    3. Blood tests for routine general physical examination  -Routine blood work.  - CBC WITH DIFFERENTIAL; Future  - VITAMIN D,25 HYDROXY (DEFICIENCY); Future  - TSH; Future    4. Encounter for screening mammogram for malignant neoplasm of breast  -Routine screening   - MA-SCREENING MAMMO BILAT W/CAD; Future       Return in about 3 weeks (around 7/7/2025) for DM, Labs.    Please note that this dictation was created using voice recognition software. I have made every reasonable attempt to correct obvious errors, but I expect that there are errors of grammar and possibly content that I did not discover before finalizing the note.

## 2025-06-16 NOTE — PATIENT INSTRUCTIONS
-Start Metformin 1 gr twice daily  -Start Lipitor 20 mg daily  -Complete blood work  -Schedule mammogram   -Follow up in 3-4 weeks

## 2025-07-11 ENCOUNTER — OFFICE VISIT (OUTPATIENT)
Dept: MEDICAL GROUP | Age: 40
End: 2025-07-11
Payer: COMMERCIAL

## 2025-07-11 ENCOUNTER — HOSPITAL ENCOUNTER (OUTPATIENT)
Facility: MEDICAL CENTER | Age: 40
End: 2025-07-11
Attending: STUDENT IN AN ORGANIZED HEALTH CARE EDUCATION/TRAINING PROGRAM
Payer: COMMERCIAL

## 2025-07-11 VITALS
WEIGHT: 132 LBS | DIASTOLIC BLOOD PRESSURE: 70 MMHG | OXYGEN SATURATION: 100 % | BODY MASS INDEX: 29.69 KG/M2 | HEART RATE: 82 BPM | HEIGHT: 56 IN | TEMPERATURE: 96.3 F | SYSTOLIC BLOOD PRESSURE: 122 MMHG

## 2025-07-11 DIAGNOSIS — E78.1 HYPERTRIGLYCERIDEMIA: ICD-10-CM

## 2025-07-11 DIAGNOSIS — E11.9 TYPE 2 DIABETES MELLITUS TREATED WITHOUT INSULIN (HCC): Primary | ICD-10-CM

## 2025-07-11 DIAGNOSIS — E11.9 TYPE 2 DIABETES MELLITUS TREATED WITHOUT INSULIN (HCC): ICD-10-CM

## 2025-07-11 LAB
CREAT UR-MCNC: 158 MG/DL
HBA1C MFR BLD: 8.2 % (ref ?–5.8)
MICROALBUMIN UR-MCNC: 2.6 MG/DL
MICROALBUMIN/CREAT UR: 16 MG/G (ref 0–30)
POCT INT CON NEG: NEGATIVE
POCT INT CON POS: POSITIVE

## 2025-07-11 PROCEDURE — 82570 ASSAY OF URINE CREATININE: CPT

## 2025-07-11 PROCEDURE — 82043 UR ALBUMIN QUANTITATIVE: CPT

## 2025-07-11 PROCEDURE — 83036 HEMOGLOBIN GLYCOSYLATED A1C: CPT | Performed by: STUDENT IN AN ORGANIZED HEALTH CARE EDUCATION/TRAINING PROGRAM

## 2025-07-11 PROCEDURE — 3074F SYST BP LT 130 MM HG: CPT | Performed by: STUDENT IN AN ORGANIZED HEALTH CARE EDUCATION/TRAINING PROGRAM

## 2025-07-11 PROCEDURE — 3078F DIAST BP <80 MM HG: CPT | Performed by: STUDENT IN AN ORGANIZED HEALTH CARE EDUCATION/TRAINING PROGRAM

## 2025-07-11 PROCEDURE — 99214 OFFICE O/P EST MOD 30 MIN: CPT | Performed by: STUDENT IN AN ORGANIZED HEALTH CARE EDUCATION/TRAINING PROGRAM

## 2025-07-11 ASSESSMENT — ENCOUNTER SYMPTOMS
DOUBLE VISION: 0
CHILLS: 0
COUGH: 0
DIZZINESS: 0
WHEEZING: 0
SHORTNESS OF BREATH: 0
BRUISES/BLEEDS EASILY: 0
BLURRED VISION: 0
BLOOD IN STOOL: 0
DEPRESSION: 0
PHOTOPHOBIA: 0
ORTHOPNEA: 0
HEADACHES: 0
FEVER: 0
BACK PAIN: 0
PALPITATIONS: 0
WEAKNESS: 0
ABDOMINAL PAIN: 0

## 2025-07-11 ASSESSMENT — LIFESTYLE VARIABLES: SUBSTANCE_ABUSE: 0

## 2025-07-11 NOTE — PATIENT INSTRUCTIONS
-Continue home meds, no changes  -May take Ibuprofen twice daily for 3-4 days  -Repeat blood work in Oct  -Follow up mid Oct

## 2025-07-11 NOTE — PROGRESS NOTES
"Subjective:     CC: Lab review follow up.     HPI:   History of Present Illness  The patient presents for a follow-up visit.    She has been experiencing a sore throat for the past 3 days, which she describes as dry and painful during swallowing. She also reports a headache. She has not taken any medication for these symptoms.    She has not undergone any lab tests as requested due to family emergency. She had discontinued her metformin 1000 mg twice daily regimen for about 6 months due to running out of the medication. However, she resumed taking it in June 2025. She was unable to start Jardiance as it was not covered by her insurance. She was also prescribed Ozempic for weight loss, but again, her insurance did not cover this medication.    MEDICATIONS  Current: Metformin.  Discontinued: Jardiance, Ozempic.       ROS:  Review of Systems   Constitutional:  Negative for chills, fever and malaise/fatigue.   HENT:  Negative for nosebleeds and tinnitus.    Eyes:  Negative for blurred vision, double vision and photophobia.   Respiratory:  Negative for cough, shortness of breath and wheezing.    Cardiovascular:  Negative for chest pain, palpitations, orthopnea and leg swelling.   Gastrointestinal:  Negative for abdominal pain, blood in stool and melena.   Genitourinary:  Negative for dysuria, frequency and urgency.   Musculoskeletal:  Negative for back pain and joint pain.   Skin:  Negative for rash.   Neurological:  Negative for dizziness, weakness and headaches.   Endo/Heme/Allergies:  Does not bruise/bleed easily.   Psychiatric/Behavioral:  Negative for depression, substance abuse and suicidal ideas.        Objective:     Exam:  /70 (BP Location: Left arm, Patient Position: Sitting, BP Cuff Size: Large adult)   Pulse 82   Temp (!) 35.7 °C (96.3 °F) (Temporal)   Ht 1.422 m (4' 8\")   Wt 59.9 kg (132 lb)   LMP 04/20/2021   SpO2 100%   BMI 29.59 kg/m²  Body mass index is 29.59 kg/m².    Physical Exam  Vitals " reviewed.   Constitutional:       General: She is not in acute distress.  HENT:      Head: Normocephalic and atraumatic.      Mouth/Throat:      Mouth: Mucous membranes are moist.   Eyes:      General: No scleral icterus.     Extraocular Movements: Extraocular movements intact.      Pupils: Pupils are equal, round, and reactive to light.   Cardiovascular:      Rate and Rhythm: Normal rate and regular rhythm.      Pulses: Normal pulses.      Heart sounds: Normal heart sounds. No murmur heard.  Pulmonary:      Effort: Pulmonary effort is normal. No respiratory distress.      Breath sounds: Normal breath sounds. No wheezing.   Abdominal:      General: There is no distension.      Palpations: Abdomen is soft.      Tenderness: There is no abdominal tenderness. There is no guarding or rebound.   Musculoskeletal:      Cervical back: Normal range of motion and neck supple.      Right lower leg: No edema.      Left lower leg: No edema.   Skin:     General: Skin is warm.      Capillary Refill: Capillary refill takes less than 2 seconds.      Coloration: Skin is not jaundiced.      Findings: No bruising.   Neurological:      General: No focal deficit present.      Mental Status: She is alert.      Cranial Nerves: No cranial nerve deficit.      Sensory: No sensory deficit.   Psychiatric:         Mood and Affect: Mood normal.         Behavior: Behavior normal.       Labs: Pending    Assessment & Plan:       1. Type 2 diabetes mellitus treated without insulin ([facility]) (Primary)  Unable to complete blood work due to family emergency requiring traveling out of town.  Her A1C level is currently at 8.2% POCT at out office today, she also undergoing POCT urime/micro. She has not been taking her metformin consistently for the past 6 months but has recently resumed it. It is recommended to continue her current medication regimen and reassess her A1C levels in mid-October 2025. If her blood sugar levels remain elevated, an additional  medication may be considered.  Encourage strict diabetic diet, regular exercise and weight loss.    - POCT Hemoglobin A1C  - POCT Microalbumin Creat Ratio Urine; Future    2. Hypertriglyceridemia  -Chronic, unclear if well controlled   -Pending blood work  -She has been taking statin therapy for less than a month  -She will complete blood work and follow up with me in 3 months.       Sore throat.  The patient's symptoms suggest a mild irritation, possibly due to overuse of her voice or excessive crying. She is advised to take ibuprofen 200 mg 2 to 3 times daily for 3 to 4 days to alleviate the discomfort      Return in about 3 months (around 10/11/2025) for DM, Labs.    Please note that this dictation was created using voice recognition software. I have made every reasonable attempt to correct obvious errors, but I expect that there are errors of grammar and possibly content that I did not discover before finalizing the note.

## (undated) DEVICE — MASK ANESTHESIA ADULT  - (100/CA)

## (undated) DEVICE — SLEEVE VASO CALF MED - (10PR/CA)

## (undated) DEVICE — APPLICATOR SURGIFLO - (6EA/CA)

## (undated) DEVICE — CANISTER SUCTION 3000ML MECHANICAL FILTER AUTO SHUTOFF MEDI-VAC NONSTERILE LF DISP  (40EA/CA)

## (undated) DEVICE — ELECTRODE DUAL RETURN W/ CORD - (50/PK)

## (undated) DEVICE — TUBE CONNECT SUCTION CLEAR 120 X 1/4" (50EA/CA)"

## (undated) DEVICE — GLOVE BIOGEL PI ORTHO SZ 7 PF LF (40PR/BX)

## (undated) DEVICE — SEALER LAPROSCOPIC L HOOK LIGASURE 37MM

## (undated) DEVICE — PACK MINOR BASIN - (2EA/CA)

## (undated) DEVICE — Device

## (undated) DEVICE — TUBING CLEARLINK DUO-VENT - C-FLO (48EA/CA)

## (undated) DEVICE — ELECTRODE 850 FOAM ADHESIVE - HYDROGEL RADIOTRNSPRNT (50/PK)

## (undated) DEVICE — CHLORAPREP 26 ML APPLICATOR - ORANGE TINT(25/CA)

## (undated) DEVICE — TRAY SRGPRP PVP IOD WT PRP - (20/CA)

## (undated) DEVICE — LACTATED RINGERS INJ 1000 ML - (14EA/CA 60CA/PF)

## (undated) DEVICE — TUBE CONNECTING SUCTION - CLEAR PLASTIC STERILE 72 IN (50EA/CA)

## (undated) DEVICE — CLOSURE SKIN STRIP 1/2 X 4 IN - (STERI STRIP) (50/BX 4BX/CA)

## (undated) DEVICE — ELECTROSURGICAL KOH EFFICIENT 3.5CM

## (undated) DEVICE — SUCTION INSTRUMENT YANKAUER OPEN TIP W/O VENT (50EA/CA)

## (undated) DEVICE — HEAD HOLDER JUNIOR/ADULT

## (undated) DEVICE — SUCTION INSTRUMENT YANKAUER BULBOUS TIP W/O VENT (50EA/CA)

## (undated) DEVICE — TUBING LAPAROSCOPIC PLUME DEVICE (10EA/CA)

## (undated) DEVICE — ARMREST CRADLE FOAM - (2PR/PK 12PR/CA)

## (undated) DEVICE — SUTURE 4-0 MONOCRYL PLUS PS-2 - 27 INCH (36/BX)

## (undated) DEVICE — WATER IRRIG. STER 3000 ML - (4/CA)

## (undated) DEVICE — SUTURE 0 COATED VICRYL 6-18IN - (12PK/BX)

## (undated) DEVICE — NEPTUNE 4 PORT MANIFOLD - (20/PK)

## (undated) DEVICE — DERMABOND ADVANCED - (12EA/BX)

## (undated) DEVICE — JELLY SURGILUBE STERILE TUBE 4.25 OZ (1/EA)

## (undated) DEVICE — NEEDLE INSFL 120MM 14GA VRRS - (20/BX)

## (undated) DEVICE — SYRINGE NON SAFETY 3 CC 21 GA X 1 1/2 IN (100/BX 8BX/CA)

## (undated) DEVICE — KIT ANESTHESIA W/CIRCUIT & 3/LT BAG W/FILTER (20EA/CA)

## (undated) DEVICE — SET LEADWIRE 5 LEAD BEDSIDE DISPOSABLE ECG (1SET OF 5/EA)

## (undated) DEVICE — GLOVE BIOGEL SZ 7 SURGICAL PF LTX - (50PR/BX 4BX/CA)

## (undated) DEVICE — SENSOR SPO2 NEO LNCS ADHESIVE (20/BX) SEE USER NOTES

## (undated) DEVICE — PAD BABY LAP 4X18 W/O - RINGS PREWASHED 5/PK 40PK/CS

## (undated) DEVICE — PAD SANITARY 11IN MAXI IND WRAPPED  (12EA/PK 24PK/CA)

## (undated) DEVICE — SET EXTENSION WITH 2 PORTS (48EA/CA) ***PART #2C8610 IS A SUBSTITUTE*****

## (undated) DEVICE — SYRINGE 30 ML LL (56/BX)

## (undated) DEVICE — TRAY FOLEY CATHETER STATLOCK 16FR SURESTEP  (10EA/CA)

## (undated) DEVICE — TROCAR 5X100 SEPARTATOR ADV - FIXATION (6/BX)

## (undated) DEVICE — TOWEL STOP TIMEOUT SAFETY FLAG (40EA/CA)

## (undated) DEVICE — BLADE SURGICAL #15 - (50/BX 3BX/CA)

## (undated) DEVICE — SET IRRIGATION CYSTOSCOPY TUBE L80 IN (20EA/CA)

## (undated) DEVICE — SUTURE 3-0 VICRYL PLUS - 12 X 18 INCH (12/BX)

## (undated) DEVICE — SUTURE 0 VICRYL PLUS CT-2 - 27 INCH (36/BX)

## (undated) DEVICE — WATER IRRIGATION STERILE 1000ML (12EA/CA)

## (undated) DEVICE — DRAPESURG STERI-DRAPE LONG - (10/BX 4BX/CA)

## (undated) DEVICE — GLOVE SZ 7 BIOGEL PI MICRO - PF LF (50PR/BX 4BX/CA)

## (undated) DEVICE — UTERINE MANIP RUMI 6.7X10 - (5/BX)

## (undated) DEVICE — SUTURE 0 VICRYL PLUS CT-1 - 36 INCH (36/BX)

## (undated) DEVICE — MANIFOLD NEPTUNE 1 PORT (20/PK)

## (undated) DEVICE — PROTECTOR ULNA NERVE - (36PR/CA)

## (undated) DEVICE — GOWN WARMING STANDARD FLEX - (30/CA)

## (undated) DEVICE — SUTURE GENERAL

## (undated) DEVICE — KIT  I.V. START (100EA/CA)

## (undated) DEVICE — CATHETER IV 20 GA X 1-1/4 ---SURG.& SDS ONLY--- (50EA/BX)

## (undated) DEVICE — SODIUM CHL IRRIGATION 0.9% 1000ML (12EA/CA)

## (undated) DEVICE — KIT SURGIFLO W/OUT THROMBIN - (6EA/CA)

## (undated) DEVICE — TUBE E-T HI-LO CUFF 6.5MM (10EA/BX)

## (undated) DEVICE — PACK LAPAROSCOPY - (1/CA)

## (undated) DEVICE — CANISTER SUCTION RIGID RED 1500CC (40EA/CA)